# Patient Record
Sex: MALE | Race: WHITE | NOT HISPANIC OR LATINO | URBAN - METROPOLITAN AREA
[De-identification: names, ages, dates, MRNs, and addresses within clinical notes are randomized per-mention and may not be internally consistent; named-entity substitution may affect disease eponyms.]

---

## 2017-06-06 ENCOUNTER — EMERGENCY (EMERGENCY)
Facility: HOSPITAL | Age: 67
LOS: 1 days | Discharge: HOME | End: 2017-06-06
Attending: EMERGENCY MEDICINE | Admitting: EMERGENCY MEDICINE
Payer: MEDICARE

## 2017-06-06 VITALS
WEIGHT: 231.93 LBS | DIASTOLIC BLOOD PRESSURE: 100 MMHG | OXYGEN SATURATION: 96 % | SYSTOLIC BLOOD PRESSURE: 195 MMHG | TEMPERATURE: 98 F | HEART RATE: 104 BPM | HEIGHT: 73 IN | RESPIRATION RATE: 16 BRPM

## 2017-06-06 VITALS
OXYGEN SATURATION: 99 % | HEART RATE: 98 BPM | SYSTOLIC BLOOD PRESSURE: 166 MMHG | DIASTOLIC BLOOD PRESSURE: 88 MMHG | RESPIRATION RATE: 16 BRPM | TEMPERATURE: 98 F

## 2017-06-06 DIAGNOSIS — R06.02 SHORTNESS OF BREATH: ICD-10-CM

## 2017-06-06 DIAGNOSIS — Z87.891 PERSONAL HISTORY OF NICOTINE DEPENDENCE: ICD-10-CM

## 2017-06-06 DIAGNOSIS — R07.89 OTHER CHEST PAIN: ICD-10-CM

## 2017-06-06 DIAGNOSIS — R00.2 PALPITATIONS: ICD-10-CM

## 2017-06-06 DIAGNOSIS — I10 ESSENTIAL (PRIMARY) HYPERTENSION: ICD-10-CM

## 2017-06-06 LAB
ALBUMIN SERPL ELPH-MCNC: 3.9 G/DL — SIGNIFICANT CHANGE UP (ref 3.3–5)
ALP SERPL-CCNC: 124 U/L — HIGH (ref 40–120)
ALT FLD-CCNC: 43 U/L — SIGNIFICANT CHANGE UP (ref 12–78)
ANION GAP SERPL CALC-SCNC: 7 MMOL/L — SIGNIFICANT CHANGE UP (ref 5–17)
AST SERPL-CCNC: 30 U/L — SIGNIFICANT CHANGE UP (ref 15–37)
BASOPHILS # BLD AUTO: 0 K/UL — SIGNIFICANT CHANGE UP (ref 0–0.2)
BASOPHILS NFR BLD AUTO: 0.7 % — SIGNIFICANT CHANGE UP (ref 0–2)
BILIRUB SERPL-MCNC: 0.8 MG/DL — SIGNIFICANT CHANGE UP (ref 0.2–1.2)
BUN SERPL-MCNC: 16 MG/DL — SIGNIFICANT CHANGE UP (ref 7–23)
CALCIUM SERPL-MCNC: 8.7 MG/DL — SIGNIFICANT CHANGE UP (ref 8.5–10.1)
CHLORIDE SERPL-SCNC: 104 MMOL/L — SIGNIFICANT CHANGE UP (ref 96–108)
CK SERPL-CCNC: 151 U/L — SIGNIFICANT CHANGE UP (ref 26–308)
CO2 SERPL-SCNC: 26 MMOL/L — SIGNIFICANT CHANGE UP (ref 22–31)
CREAT SERPL-MCNC: 1.2 MG/DL — SIGNIFICANT CHANGE UP (ref 0.5–1.3)
D DIMER BLD IA.RAPID-MCNC: <150 NG/ML DDU — SIGNIFICANT CHANGE UP
EOSINOPHIL # BLD AUTO: 0 K/UL — SIGNIFICANT CHANGE UP (ref 0–0.5)
EOSINOPHIL NFR BLD AUTO: 0.5 % — SIGNIFICANT CHANGE UP (ref 0–6)
GLUCOSE SERPL-MCNC: 225 MG/DL — HIGH (ref 70–99)
HCT VFR BLD CALC: 48.1 % — SIGNIFICANT CHANGE UP (ref 39–50)
HGB BLD-MCNC: 13.9 G/DL — SIGNIFICANT CHANGE UP (ref 13–17)
INR BLD: 0.96 RATIO — SIGNIFICANT CHANGE UP (ref 0.88–1.16)
LYMPHOCYTES # BLD AUTO: 0.8 K/UL — LOW (ref 1–3.3)
LYMPHOCYTES # BLD AUTO: 11.7 % — LOW (ref 13–44)
MCHC RBC-ENTMCNC: 25.1 PG — LOW (ref 27–34)
MCHC RBC-ENTMCNC: 28.8 GM/DL — LOW (ref 32–36)
MCV RBC AUTO: 87 FL — SIGNIFICANT CHANGE UP (ref 80–100)
MONOCYTES # BLD AUTO: 0.1 K/UL — SIGNIFICANT CHANGE UP (ref 0–0.9)
MONOCYTES NFR BLD AUTO: 1.5 % — SIGNIFICANT CHANGE UP (ref 1–9)
NEUTROPHILS # BLD AUTO: 5.9 K/UL — SIGNIFICANT CHANGE UP (ref 1.8–7.4)
NEUTROPHILS NFR BLD AUTO: 85.6 % — HIGH (ref 43–77)
NT-PROBNP SERPL-SCNC: 21 PG/ML — SIGNIFICANT CHANGE UP (ref 0–125)
PLATELET # BLD AUTO: 239 K/UL — SIGNIFICANT CHANGE UP (ref 150–400)
POTASSIUM SERPL-MCNC: 3.4 MMOL/L — LOW (ref 3.5–5.3)
POTASSIUM SERPL-SCNC: 3.4 MMOL/L — LOW (ref 3.5–5.3)
PROT SERPL-MCNC: 7.8 G/DL — SIGNIFICANT CHANGE UP (ref 6–8.3)
PROTHROM AB SERPL-ACNC: 10.4 SEC — SIGNIFICANT CHANGE UP (ref 9.8–12.7)
RBC # BLD: 5.52 M/UL — SIGNIFICANT CHANGE UP (ref 4.2–5.8)
RBC # FLD: 12.1 % — SIGNIFICANT CHANGE UP (ref 10.3–14.5)
SODIUM SERPL-SCNC: 137 MMOL/L — SIGNIFICANT CHANGE UP (ref 135–145)
TROPONIN I SERPL-MCNC: <.015 NG/ML — SIGNIFICANT CHANGE UP (ref 0.01–0.04)
WBC # BLD: 6.9 K/UL — SIGNIFICANT CHANGE UP (ref 3.8–10.5)
WBC # FLD AUTO: 6.9 K/UL — SIGNIFICANT CHANGE UP (ref 3.8–10.5)

## 2017-06-06 PROCEDURE — 99284 EMERGENCY DEPT VISIT MOD MDM: CPT

## 2017-06-06 PROCEDURE — 71045 X-RAY EXAM CHEST 1 VIEW: CPT

## 2017-06-06 PROCEDURE — 93005 ELECTROCARDIOGRAM TRACING: CPT

## 2017-06-06 PROCEDURE — 84484 ASSAY OF TROPONIN QUANT: CPT

## 2017-06-06 PROCEDURE — 94640 AIRWAY INHALATION TREATMENT: CPT

## 2017-06-06 PROCEDURE — 85379 FIBRIN DEGRADATION QUANT: CPT

## 2017-06-06 PROCEDURE — 85610 PROTHROMBIN TIME: CPT

## 2017-06-06 PROCEDURE — 85027 COMPLETE CBC AUTOMATED: CPT

## 2017-06-06 PROCEDURE — 71010: CPT | Mod: 26

## 2017-06-06 PROCEDURE — 83880 ASSAY OF NATRIURETIC PEPTIDE: CPT

## 2017-06-06 PROCEDURE — 80053 COMPREHEN METABOLIC PANEL: CPT

## 2017-06-06 PROCEDURE — 36000 PLACE NEEDLE IN VEIN: CPT

## 2017-06-06 PROCEDURE — 82550 ASSAY OF CK (CPK): CPT

## 2017-06-06 PROCEDURE — 99285 EMERGENCY DEPT VISIT HI MDM: CPT

## 2017-06-06 RX ORDER — IPRATROPIUM/ALBUTEROL SULFATE 18-103MCG
3 AEROSOL WITH ADAPTER (GRAM) INHALATION ONCE
Qty: 0 | Refills: 0 | Status: COMPLETED | OUTPATIENT
Start: 2017-06-06 | End: 2017-06-06

## 2017-06-06 RX ORDER — ASPIRIN/CALCIUM CARB/MAGNESIUM 324 MG
325 TABLET ORAL ONCE
Qty: 0 | Refills: 0 | Status: COMPLETED | OUTPATIENT
Start: 2017-06-06 | End: 2017-06-06

## 2017-06-06 RX ORDER — SODIUM CHLORIDE 9 MG/ML
1000 INJECTION INTRAMUSCULAR; INTRAVENOUS; SUBCUTANEOUS ONCE
Qty: 0 | Refills: 0 | Status: COMPLETED | OUTPATIENT
Start: 2017-06-06 | End: 2017-06-06

## 2017-06-06 RX ADMIN — Medication 3 MILLILITER(S): at 17:01

## 2017-06-06 RX ADMIN — SODIUM CHLORIDE 1000 MILLILITER(S): 9 INJECTION INTRAMUSCULAR; INTRAVENOUS; SUBCUTANEOUS at 17:02

## 2017-06-06 RX ADMIN — Medication 325 MILLIGRAM(S): at 17:01

## 2017-06-06 NOTE — ED PROVIDER NOTE - OBJECTIVE STATEMENT
67 male presents to ER c/o chest discomfort, cough shortness of breath with exertion, palpitations over the past week, getting worse in the last 3 days, went to urgent care last night, placed on medrol dosepack, started this morning, used rescue inhaler x 2 and advair.

## 2017-06-06 NOTE — ED PROVIDER NOTE - PROGRESS NOTE DETAILS
Patient feeling well, blood pressure improved, labs, ekg, chest xray reviwed, no acute findings, spoke with PMD Dr. Sharma, case discussed, will see patient in office tomorrow

## 2017-06-06 NOTE — ED ADULT NURSE NOTE - CHPI ED SYMPTOMS NEG
no back pain/no cough/no vomiting/no nausea/no dizziness/no fever/no diaphoresis/no chills/no syncope

## 2017-06-06 NOTE — ED ADULT NURSE NOTE - OBJECTIVE STATEMENT
pt reporting non radiating Chest tightness with SOB x3-4 days. pt has hx of asthma, and yesterday was placed on a medrol dose without relief. pt reporting pain in deep inspiration and on exertion. denies fever, chills, coughing, wheezing. denies palpations. no sob noted on arrival. speaks in full sentences, equal chest and rise and fall. pt reporting non radiating Chest tightness with SOB x3-4 days. pt has hx of asthma, and yesterday was placed on a medrol dose without relief. pt reporting pain with deep inspiration and on exertion with dry wheezy cough. denies fever, chills. denies palpations. no sob noted on arrival. speaks in full sentences, equal chest and rise and fall.

## 2017-06-06 NOTE — ED PROVIDER NOTE - MEDICAL DECISION MAKING DETAILS
67 male with chest discomfort, cough, shortness of breath, f/u labs, ekg, noted elevated blood pressure, to re-eval

## 2017-06-14 ENCOUNTER — INPATIENT (INPATIENT)
Facility: HOSPITAL | Age: 67
LOS: 1 days | Discharge: HOME | DRG: 247 | End: 2017-06-16
Attending: INTERNAL MEDICINE | Admitting: INTERNAL MEDICINE
Payer: MEDICARE

## 2017-06-14 VITALS
RESPIRATION RATE: 18 BRPM | TEMPERATURE: 98 F | DIASTOLIC BLOOD PRESSURE: 95 MMHG | WEIGHT: 233.91 LBS | OXYGEN SATURATION: 98 % | HEIGHT: 73 IN | HEART RATE: 72 BPM | SYSTOLIC BLOOD PRESSURE: 137 MMHG

## 2017-06-14 DIAGNOSIS — R94.39 ABNORMAL RESULT OF OTHER CARDIOVASCULAR FUNCTION STUDY: ICD-10-CM

## 2017-06-14 LAB
ALBUMIN SERPL ELPH-MCNC: 4.6 G/DL — SIGNIFICANT CHANGE UP (ref 3.3–5)
ALP SERPL-CCNC: 101 U/L — SIGNIFICANT CHANGE UP (ref 40–120)
ALT FLD-CCNC: 32 U/L RC — SIGNIFICANT CHANGE UP (ref 10–45)
ANION GAP SERPL CALC-SCNC: 13 MMOL/L — SIGNIFICANT CHANGE UP (ref 5–17)
AST SERPL-CCNC: 24 U/L — SIGNIFICANT CHANGE UP (ref 10–40)
BILIRUB SERPL-MCNC: 0.9 MG/DL — SIGNIFICANT CHANGE UP (ref 0.2–1.2)
BUN SERPL-MCNC: 19 MG/DL — SIGNIFICANT CHANGE UP (ref 7–23)
CALCIUM SERPL-MCNC: 9.6 MG/DL — SIGNIFICANT CHANGE UP (ref 8.4–10.5)
CHLORIDE SERPL-SCNC: 103 MMOL/L — SIGNIFICANT CHANGE UP (ref 96–108)
CO2 SERPL-SCNC: 25 MMOL/L — SIGNIFICANT CHANGE UP (ref 22–31)
CREAT SERPL-MCNC: 1.19 MG/DL — SIGNIFICANT CHANGE UP (ref 0.5–1.3)
GLUCOSE SERPL-MCNC: 102 MG/DL — HIGH (ref 70–99)
HCT VFR BLD CALC: 44.4 % — SIGNIFICANT CHANGE UP (ref 39–50)
HGB BLD-MCNC: 15.6 G/DL — SIGNIFICANT CHANGE UP (ref 13–17)
MCHC RBC-ENTMCNC: 30.9 PG — SIGNIFICANT CHANGE UP (ref 27–34)
MCHC RBC-ENTMCNC: 35.2 GM/DL — SIGNIFICANT CHANGE UP (ref 32–36)
MCV RBC AUTO: 87.8 FL — SIGNIFICANT CHANGE UP (ref 80–100)
PLATELET # BLD AUTO: 241 K/UL — SIGNIFICANT CHANGE UP (ref 150–400)
POTASSIUM SERPL-MCNC: 3.7 MMOL/L — SIGNIFICANT CHANGE UP (ref 3.5–5.3)
POTASSIUM SERPL-SCNC: 3.7 MMOL/L — SIGNIFICANT CHANGE UP (ref 3.5–5.3)
PROT SERPL-MCNC: 7.9 G/DL — SIGNIFICANT CHANGE UP (ref 6–8.3)
RBC # BLD: 5.05 M/UL — SIGNIFICANT CHANGE UP (ref 4.2–5.8)
RBC # FLD: 12.2 % — SIGNIFICANT CHANGE UP (ref 10.3–14.5)
SODIUM SERPL-SCNC: 141 MMOL/L — SIGNIFICANT CHANGE UP (ref 135–145)
WBC # BLD: 11.1 K/UL — HIGH (ref 3.8–10.5)
WBC # FLD AUTO: 11.1 K/UL — HIGH (ref 3.8–10.5)

## 2017-06-14 PROCEDURE — 92928 PRQ TCAT PLMT NTRAC ST 1 LES: CPT | Mod: LC,GC

## 2017-06-14 PROCEDURE — 93010 ELECTROCARDIOGRAM REPORT: CPT | Mod: 76

## 2017-06-14 PROCEDURE — 93458 L HRT ARTERY/VENTRICLE ANGIO: CPT | Mod: 26,59,GC

## 2017-06-14 RX ORDER — CLOPIDOGREL BISULFATE 75 MG/1
75 TABLET, FILM COATED ORAL DAILY
Qty: 0 | Refills: 0 | Status: DISCONTINUED | OUTPATIENT
Start: 2017-06-15 | End: 2017-06-16

## 2017-06-14 RX ORDER — RANOLAZINE 500 MG/1
500 TABLET, FILM COATED, EXTENDED RELEASE ORAL ONCE
Qty: 0 | Refills: 0 | Status: DISCONTINUED | OUTPATIENT
Start: 2017-06-14 | End: 2017-06-16

## 2017-06-14 RX ORDER — ATORVASTATIN CALCIUM 80 MG/1
20 TABLET, FILM COATED ORAL AT BEDTIME
Qty: 0 | Refills: 0 | Status: DISCONTINUED | OUTPATIENT
Start: 2017-06-14 | End: 2017-06-16

## 2017-06-14 RX ORDER — MONTELUKAST 4 MG/1
10 TABLET, CHEWABLE ORAL DAILY
Qty: 0 | Refills: 0 | Status: DISCONTINUED | OUTPATIENT
Start: 2017-06-14 | End: 2017-06-16

## 2017-06-14 RX ORDER — AMLODIPINE BESYLATE 2.5 MG/1
5 TABLET ORAL DAILY
Qty: 0 | Refills: 0 | Status: DISCONTINUED | OUTPATIENT
Start: 2017-06-14 | End: 2017-06-16

## 2017-06-14 RX ORDER — LOSARTAN POTASSIUM 100 MG/1
100 TABLET, FILM COATED ORAL DAILY
Qty: 0 | Refills: 0 | Status: DISCONTINUED | OUTPATIENT
Start: 2017-06-14 | End: 2017-06-16

## 2017-06-14 RX ORDER — ASPIRIN/CALCIUM CARB/MAGNESIUM 324 MG
81 TABLET ORAL DAILY
Qty: 0 | Refills: 0 | Status: DISCONTINUED | OUTPATIENT
Start: 2017-06-14 | End: 2017-06-16

## 2017-06-14 RX ORDER — POTASSIUM CHLORIDE 20 MEQ
1 PACKET (EA) ORAL
Qty: 0 | Refills: 0 | COMMUNITY

## 2017-06-14 RX ORDER — POTASSIUM CHLORIDE 20 MEQ
40 PACKET (EA) ORAL DAILY
Qty: 0 | Refills: 0 | Status: DISCONTINUED | OUTPATIENT
Start: 2017-06-14 | End: 2017-06-16

## 2017-06-14 RX ORDER — ALBUTEROL 90 UG/1
2 AEROSOL, METERED ORAL EVERY 6 HOURS
Qty: 0 | Refills: 0 | Status: DISCONTINUED | OUTPATIENT
Start: 2017-06-14 | End: 2017-06-16

## 2017-06-14 RX ORDER — ROSUVASTATIN CALCIUM 5 MG/1
0 TABLET ORAL
Qty: 0 | Refills: 0 | COMMUNITY

## 2017-06-14 RX ORDER — BUDESONIDE AND FORMOTEROL FUMARATE DIHYDRATE 160; 4.5 UG/1; UG/1
2 AEROSOL RESPIRATORY (INHALATION)
Qty: 0 | Refills: 0 | Status: DISCONTINUED | OUTPATIENT
Start: 2017-06-14 | End: 2017-06-16

## 2017-06-14 NOTE — H&P CARDIOLOGY - HISTORY OF PRESENT ILLNESS
This is a 66 yo male PMH asthma well controlled, HTN, HLD, former smoker 22 pack years, obesity BMI 30.8 presented to cardiologist, Dr. Hamilton, with complaints of progressing episodes of exertional chest pressure radiating to jaw while walking relieved after few minutes of rest over the past month. Pt. also states he has had peripheral edema on occasion over past year.  Pt. denies SOB/RAHMAN, dizziness, diaphoresis, palpitations, nausea, vomiting, or syncope. Pt. states he went to an urgent care after one episode where he was given a Medrol dose pack.  Stress Echo 6/14/17 fair exercise tolerance, 1mm horizontal ST depression V3-V6, normal LV systolic function.  Images demonstrate entire apex, inferior and posterior walls became akinetic, all remaining wall segments became hyperdynamic.  Ischemia in the RCA/LAD distribution.  Pt. presents now asymptomatic for further evaluation and cardiac cath. This is a 68 yo male PMH asthma well controlled, HTN, HLD, former smoker 22 pack years, obesity BMI 30.8 presented to cardiologist, Dr. Hamilton, with complaints of progressing episodes of exertional chest pressure radiating to jaw while walking relieved after few minutes of rest over the past month. Pt. also states he has had peripheral edema on occasion over past year.  Pt. denies SOB/RAHMAN, dizziness, diaphoresis, palpitations, nausea, vomiting, or syncope. Pt. states he went to an urgent care after one episode where he was given a Medrol dose pack.  Stress Echo 6/14/17 fair exercise tolerance, 1mm horizontal ST depression V3-V6, normal LV systolic function.  Images demonstrate entire apex, inferior and posterior walls became akinetic, all remaining wall segments became hyperdynamic.  Ischemia in the RCA/LAD distribution.  Pt. presents now asymptomatic for further evaluation and cardiac cath.     Antianginal regimen:  Amlodipine, given Ranexa 500 mg po x 1

## 2017-06-14 NOTE — PATIENT PROFILE ADULT. - PATIENT REPRESENTATIVE: ( YOU CAN CHOOSE ANY PERSON THAT CAN ASSIST YOU WITH YOUR HEALTH CARE PREFERENCES, DOES NOT HAVE TO BE A SPOUSE, IMMEDIATE FAMILY OR SIGNIFICANT OTHER/PARTNER)
To start maura forte for gall bladder protection. May advance diet to solid phase. Remember to measure portions, to keep the focus on increasing your protein & keeping your carbs low. Continue the use of protein shakes. To follow recommendations of dietician. Stay hydrated! Eat regularly, eat slowly & do not drink with your meals. Vitamins to be taken include your multivitamin, B12, calcium citrate, Vit D & Bcomplex. Refer to your notebook & handouts for dosages. Continue to increase cardio activity. May add resistance exercises in 2 weeks. Continue follow-up with your PCP. Return to this office in 1 month. Call if questions/concerns prior to your office visit. Walking for Exercise: Care Instructions  Your Care Instructions  Walking is one of the easiest ways to get the exercise you need for good health. A brisk, 30-minute walk each day can help you feel better and have more energy. It can help you lower your risk of disease. Walking can help you keep your bones strong and your heart healthy. Check with your doctor before you start a walking plan if you have heart problems, other health issues, or you have not been active in a long time. Follow your doctor's instructions for safe levels of exercise. Follow-up care is a key part of your treatment and safety. Be sure to make and go to all appointments, and call your doctor if you are having problems. It's also a good idea to know your test results and keep a list of the medicines you take. How can you care for yourself at home? Getting started  · Start slowly and set a short-term goal. For example, walk for 5 or 10 minutes every day. · Bit by bit, increase the amount you walk every day. Try for at least 30 minutes on most days of the week. You also may want to swim, bike, or do other activities. · If finding enough time is a problem, it is fine to be active in blocks of 10 minutes or more throughout your day and week.   · To get the heart-healthy benefits of walking, you need to walk briskly enough to increase your heart rate and breathing, but not so fast that you cannot talk comfortably. · Wear comfortable shoes that fit well and provide good support for your feet and ankles. Staying with your plan  · After you've made walking a habit, set a longer-term goal. You may want to set a goal of walking briskly for longer or walking farther. Experts say to do 2½ hours of moderate activity a week. A faster heartbeat is what defines moderate-level activity. · To stay motivated, walk with friends, coworkers, or pets. · Use a phone sandra or pedometer to track your steps each day. Set a goal to increase your steps. Once you get there, set a higher goal. Adults should work toward 10,000 steps a day. Children who are 10to 15years old need more steps--at least 12,000 for girls and at least 15,000 for boys. · If the weather keeps you from walking outside, go for walks at the mall with a friend. Local schools and churches may have indoor gyms where you can walk. Fitting a walk into your workday  · Park several blocks away from work, or get off the bus a few stops early. · Use the stairs instead of the elevator, at least for a few floors. · Suggest holding meetings with colleagues during a walk inside or outside the building. · Use the restroom that is the farthest from your desk or workstation. · Use your morning and afternoon breaks to take quick 15-minute walks. Staying safe  · Know your surroundings. Walk in a well-lighted, safe place. If it is dark, walk with a partner. Wear light-colored clothing. If you can, buy a vest or jacket that reflects light. · Carry a cell phone for emergencies. · Drink plenty of water. Take a water bottle with you when you walk. This is very important if it is hot out. · Be careful not to slip on wet or icy ground. You can buy \"grippers\" for your shoes to help keep you from slipping.   · Pay attention to your walking surface. Use sidewalks and paths. · If you have breathing problems like asthma or COPD, ask your doctor when it is safe for you to walk outdoors. Cold, dry air, smog, pollen, or other things in the air could cause breathing problems. Where can you learn more? Go to http://bentley-gina.info/. Enter R159 in the search box to learn more about \"Walking for Exercise: Care Instructions. \"  Current as of: May 27, 2016  Content Version: 11.2  © 6510-2073 [employer]. Care instructions adapted under license by [employer] (which disclaims liability or warranty for this information). If you have questions about a medical condition or this instruction, always ask your healthcare professional. [address] any warranty or liability for your use of this information. Declines

## 2017-06-14 NOTE — H&P CARDIOLOGY - PMH
Asthma    Former smoker    HLD (hyperlipidemia)    Hypertension    Lower extremity edema    Lumbar herniated disc  l4

## 2017-06-14 NOTE — PATIENT PROFILE ADULT. - VISION (WITH CORRECTIVE LENSES IF THE PATIENT USUALLY WEARS THEM):
Normal vision: sees adequately in most situations; can see medication labels, newsprint/with reading glasses

## 2017-06-15 DIAGNOSIS — E78.5 HYPERLIPIDEMIA, UNSPECIFIED: ICD-10-CM

## 2017-06-15 DIAGNOSIS — I25.118 ATHEROSCLEROTIC HEART DISEASE OF NATIVE CORONARY ARTERY WITH OTHER FORMS OF ANGINA PECTORIS: ICD-10-CM

## 2017-06-15 DIAGNOSIS — I10 ESSENTIAL (PRIMARY) HYPERTENSION: ICD-10-CM

## 2017-06-15 LAB
ANION GAP SERPL CALC-SCNC: 13 MMOL/L — SIGNIFICANT CHANGE UP (ref 5–17)
BASOPHILS # BLD AUTO: 0 K/UL — SIGNIFICANT CHANGE UP (ref 0–0.2)
BASOPHILS NFR BLD AUTO: 0.6 % — SIGNIFICANT CHANGE UP (ref 0–2)
BUN SERPL-MCNC: 21 MG/DL — SIGNIFICANT CHANGE UP (ref 7–23)
CALCIUM SERPL-MCNC: 8.9 MG/DL — SIGNIFICANT CHANGE UP (ref 8.4–10.5)
CHLORIDE SERPL-SCNC: 102 MMOL/L — SIGNIFICANT CHANGE UP (ref 96–108)
CO2 SERPL-SCNC: 25 MMOL/L — SIGNIFICANT CHANGE UP (ref 22–31)
CREAT SERPL-MCNC: 1.18 MG/DL — SIGNIFICANT CHANGE UP (ref 0.5–1.3)
EOSINOPHIL # BLD AUTO: 0.3 K/UL — SIGNIFICANT CHANGE UP (ref 0–0.5)
EOSINOPHIL NFR BLD AUTO: 4.6 % — SIGNIFICANT CHANGE UP (ref 0–6)
GLUCOSE SERPL-MCNC: 110 MG/DL — HIGH (ref 70–99)
HBA1C BLD-MCNC: 5.5 % — SIGNIFICANT CHANGE UP (ref 4–5.6)
HCT VFR BLD CALC: 40.6 % — SIGNIFICANT CHANGE UP (ref 39–50)
HGB BLD-MCNC: 14.4 G/DL — SIGNIFICANT CHANGE UP (ref 13–17)
LYMPHOCYTES # BLD AUTO: 2 K/UL — SIGNIFICANT CHANGE UP (ref 1–3.3)
LYMPHOCYTES # BLD AUTO: 25.8 % — SIGNIFICANT CHANGE UP (ref 13–44)
MCHC RBC-ENTMCNC: 31.1 PG — SIGNIFICANT CHANGE UP (ref 27–34)
MCHC RBC-ENTMCNC: 35.4 GM/DL — SIGNIFICANT CHANGE UP (ref 32–36)
MCV RBC AUTO: 87.8 FL — SIGNIFICANT CHANGE UP (ref 80–100)
MONOCYTES # BLD AUTO: 0.8 K/UL — SIGNIFICANT CHANGE UP (ref 0–0.9)
MONOCYTES NFR BLD AUTO: 10.2 % — SIGNIFICANT CHANGE UP (ref 2–14)
NEUTROPHILS # BLD AUTO: 4.5 K/UL — SIGNIFICANT CHANGE UP (ref 1.8–7.4)
NEUTROPHILS NFR BLD AUTO: 58.8 % — SIGNIFICANT CHANGE UP (ref 43–77)
PLATELET # BLD AUTO: 187 K/UL — SIGNIFICANT CHANGE UP (ref 150–400)
POTASSIUM SERPL-MCNC: 3.5 MMOL/L — SIGNIFICANT CHANGE UP (ref 3.5–5.3)
POTASSIUM SERPL-SCNC: 3.5 MMOL/L — SIGNIFICANT CHANGE UP (ref 3.5–5.3)
RBC # BLD: 4.62 M/UL — SIGNIFICANT CHANGE UP (ref 4.2–5.8)
RBC # FLD: 12.2 % — SIGNIFICANT CHANGE UP (ref 10.3–14.5)
SODIUM SERPL-SCNC: 140 MMOL/L — SIGNIFICANT CHANGE UP (ref 135–145)
WBC # BLD: 7.7 K/UL — SIGNIFICANT CHANGE UP (ref 3.8–10.5)
WBC # FLD AUTO: 7.7 K/UL — SIGNIFICANT CHANGE UP (ref 3.8–10.5)

## 2017-06-15 PROCEDURE — 93010 ELECTROCARDIOGRAM REPORT: CPT | Mod: 77

## 2017-06-15 PROCEDURE — 92928 PRQ TCAT PLMT NTRAC ST 1 LES: CPT | Mod: LD,GC

## 2017-06-15 PROCEDURE — 93010 ELECTROCARDIOGRAM REPORT: CPT

## 2017-06-15 RX ORDER — ACETAMINOPHEN 500 MG
650 TABLET ORAL EVERY 6 HOURS
Qty: 0 | Refills: 0 | Status: DISCONTINUED | OUTPATIENT
Start: 2017-06-15 | End: 2017-06-16

## 2017-06-15 RX ORDER — POTASSIUM BICARBONATE 978 MG/1
25 TABLET, EFFERVESCENT ORAL ONCE
Qty: 0 | Refills: 0 | Status: DISCONTINUED | OUTPATIENT
Start: 2017-06-15 | End: 2017-06-15

## 2017-06-15 RX ADMIN — AMLODIPINE BESYLATE 5 MILLIGRAM(S): 2.5 TABLET ORAL at 05:51

## 2017-06-15 RX ADMIN — Medication 650 MILLIGRAM(S): at 18:21

## 2017-06-15 RX ADMIN — BUDESONIDE AND FORMOTEROL FUMARATE DIHYDRATE 2 PUFF(S): 160; 4.5 AEROSOL RESPIRATORY (INHALATION) at 17:42

## 2017-06-15 RX ADMIN — BUDESONIDE AND FORMOTEROL FUMARATE DIHYDRATE 2 PUFF(S): 160; 4.5 AEROSOL RESPIRATORY (INHALATION) at 05:57

## 2017-06-15 RX ADMIN — ATORVASTATIN CALCIUM 20 MILLIGRAM(S): 80 TABLET, FILM COATED ORAL at 21:58

## 2017-06-15 RX ADMIN — Medication 81 MILLIGRAM(S): at 05:52

## 2017-06-15 RX ADMIN — Medication 40 MILLIEQUIVALENT(S): at 05:52

## 2017-06-15 RX ADMIN — MONTELUKAST 10 MILLIGRAM(S): 4 TABLET, CHEWABLE ORAL at 05:52

## 2017-06-15 RX ADMIN — Medication 650 MILLIGRAM(S): at 18:50

## 2017-06-15 RX ADMIN — CLOPIDOGREL BISULFATE 75 MILLIGRAM(S): 75 TABLET, FILM COATED ORAL at 05:52

## 2017-06-15 RX ADMIN — LOSARTAN POTASSIUM 100 MILLIGRAM(S): 100 TABLET, FILM COATED ORAL at 05:51

## 2017-06-15 NOTE — PROGRESS NOTE ADULT - ASSESSMENT
HPI:  This is a 66 yo male PMH asthma well controlled, HTN, HLD, former smoker 22 pack years, obesity BMI 30.8 presented to cardiologist, Dr. Hamilton, with complaints of progressing episodes of exertional chest pressure radiating to jaw while walking relieved after few minutes of rest over the past month. Pt. also states he has had peripheral edema on occasion over past year.  Pt. denies SOB/RAHMAN, dizziness, diaphoresis, palpitations, nausea, vomiting, or syncope. Pt. states he went to an urgent care after one episode where he was given a Medrol dose pack.  Stress Echo 6/14/17 fair exercise tolerance, 1mm horizontal ST depression V3-V6, normal LV systolic function.  Images demonstrate entire apex, inferior and posterior walls became akinetic, all remaining wall segments became hyperdynamic.  Ischemia in the RCA/LAD distribution.  Pt. presents now asymptomatic for further evaluation and cardiac cath.     Antianginal regimen:  Amlodipine, given Ranexa 500 mg po x 1 (14 Jun 2017 16:27)

## 2017-06-15 NOTE — PROGRESS NOTE ADULT - SUBJECTIVE AND OBJECTIVE BOX
Patient is a 67y old  Male who presents with a chief complaint of         Allergies    No Known Allergies    Intolerances        Medications:  ranolazine 500milliGRAM(s) Oral once  aspirin enteric coated 81milliGRAM(s) Oral daily  atorvastatin 20milliGRAM(s) Oral at bedtime  ALBUTerol    90 MICROgram(s) HFA Inhaler 2Puff(s) Inhalation every 6 hours PRN  amLODIPine   Tablet 5milliGRAM(s) Oral daily  montelukast 10milliGRAM(s) Oral daily  potassium chloride    Tablet ER 40milliEquivalent(s) Oral daily  losartan 100milliGRAM(s) Oral daily  hydrochlorothiazide   Tablet 25milliGRAM(s) Oral daily  clopidogrel Tablet 75milliGRAM(s) Oral daily  buDESOnide 160 MICROgram(s)/formoterol 4.5 MICROgram(s) Inhaler 2Puff(s) Inhalation two times a day      Vitals:  T(C): 36.7, Max: 36.8 ( @ 16:27)  HR: 61 (60 - 78)  BP: 117/68 (117/68 - 148/92)  BP(mean): 109 (109 - 109)  RR: 17 (17 - 18)  SpO2: 99% (94% - 99%)  Wt(kg): --  Daily Height in cm: 185.42 (2017 16:27)    Daily Weight in k.1 (2017 16:27)  I&O's Summary        Physical Exam:  Appearance: Normal  Eyes: PERRL, EOMI  HENT: Normal oral muscosa, NC/AT  Cardiovascular: S1S2, RRR, No M/R/G, no JVD, No Lower extremity edema  Procedural Access Site: No hematoma, Non-tender to palpation, 2+ pulse, No bruit, No Ecchymosis  Respiratory: Clear to auscultation bilaterally  Gastrointestinal: Soft, Non tender, Normal Bowel Sounds  Musculoskeletal: No clubbing, No joint deformity   Neurologic: Non-focal  Lymphatic: No lymphadenopathy  Psychiatry: AAOx3, Mood & affect appropriate  Skin: No rashes, No ecchymoses, No cyanosis        141  |  103  |  19  ----------------------------<  102<H>  3.7   |  25  |  1.19    Ca    9.6      2017 16:32    TPro  7.9  /  Alb  4.6  /  TBili  0.9  /  DBili  x   /  AST  24  /  ALT  32  /  AlkPhos  101  06-14            Lipid panel   Hgb A1c                         14.4   7.7   )-----------( 187      ( 15 Ayaan 2017 05:21 )             40.6         ECG:    Echo:    Stress Testing:     Cath: one stent to the distal left circ , staged PCI to prox LAD on 6/15    Imaging:    Interpretation of Telemetry: Patient is a 67y old  Male who presents with a chief complaint of         Allergies    No Known Allergies    Intolerances        Medications:  ranolazine 500milliGRAM(s) Oral once  aspirin enteric coated 81milliGRAM(s) Oral daily  atorvastatin 20milliGRAM(s) Oral at bedtime  ALBUTerol    90 MICROgram(s) HFA Inhaler 2Puff(s) Inhalation every 6 hours PRN  amLODIPine   Tablet 5milliGRAM(s) Oral daily  montelukast 10milliGRAM(s) Oral daily  potassium chloride    Tablet ER 40milliEquivalent(s) Oral daily  losartan 100milliGRAM(s) Oral daily  hydrochlorothiazide   Tablet 25milliGRAM(s) Oral daily  clopidogrel Tablet 75milliGRAM(s) Oral daily  buDESOnide 160 MICROgram(s)/formoterol 4.5 MICROgram(s) Inhaler 2Puff(s) Inhalation two times a day      Vitals:  T(C): 36.7, Max: 36.8 (-14 @ 16:27)  HR: 61 (60 - 78)  BP: 117/68 (117/68 - 148/92)  BP(mean): 109 (109 - 109)  RR: 17 (17 - 18)  SpO2: 99% (94% - 99%)  Wt(kg): --  Daily Height in cm: 185.42 (2017 16:27)    Daily Weight in k.1 (2017 16:27)  I&O's Summary        Physical Exam:  Appearance: Normal  Eyes: PERRL, EOMI  HENT: Normal oral muscosa, NC/AT  Cardiovascular: S1S2, RRR, No M/R/G, no JVD, No Lower extremity edema  Procedural Access Site: No hematoma, Non-tender to palpation, 2+ pulse, No bruit, No Ecchymosis  Respiratory: Clear to auscultation bilaterally  Gastrointestinal: Soft, Non tender, Normal Bowel Sounds  Musculoskeletal: No clubbing, No joint deformity   Neurologic: Non-focal  Lymphatic: No lymphadenopathy  Psychiatry: AAOx3, Mood & affect appropriate  Skin: No rashes, No ecchymoses, No cyanosis    -15    140  |  102  |  21  ----------------------------<  110<H>  3.5   |  25  |  1.18    Ca    8.9      15 Ayaan 2017 05:21    TPro  7.9  /  Alb  4.6  /  TBili  0.9  /  DBili  x   /  AST  24  /  ALT  32  /  AlkPhos  101  06-14    Klyte 25 meq PO x1 for K+ 3.5            Lipid panel   Hgb A1c                         14.4   7.7   )-----------( 187      ( 15 Ayaan 2017 05:21 )             40.6         ECG: SB 49 bpm    Echo:    Stress Testing:     Cath: one stent to the distal left circ , staged PCI to the prox LAD 6/15    Imaging:    Interpretation of Telemetry: Patient is a 67y old  Male who presents with a chief complaint of chest pressure        Allergies: No Known Allergies    Intolerances        Medications:  ranolazine 500milliGRAM(s) Oral once  aspirin enteric coated 81milliGRAM(s) Oral daily  atorvastatin 20milliGRAM(s) Oral at bedtime  ALBUTerol    90 MICROgram(s) HFA Inhaler 2Puff(s) Inhalation every 6 hours PRN  amLODIPine   Tablet 5milliGRAM(s) Oral daily  montelukast 10milliGRAM(s) Oral daily  potassium chloride    Tablet ER 40milliEquivalent(s) Oral daily  losartan 100milliGRAM(s) Oral daily  hydrochlorothiazide   Tablet 25milliGRAM(s) Oral daily  clopidogrel Tablet 75milliGRAM(s) Oral daily  buDESOnide 160 MICROgram(s)/formoterol 4.5 MICROgram(s) Inhaler 2Puff(s) Inhalation two times a day      Vitals:  T(C): 36.7, Max: 36.8 ( @ 16:27)  HR: 61 (60 - 78)  BP: 117/68 (117/68 - 148/92)  BP(mean): 109 (109 - 109)  RR: 17 (17 - 18)  SpO2: 99% (94% - 99%)  Wt(kg): --  Daily Height in cm: 185.42 (2017 16:27)    Daily Weight in k.1 (2017 16:27)  I&O's Summary        Physical Exam:  Appearance: Normal  Eyes: PERRL, EOMI  HENT: Normal oral muscosa, NC/AT  Cardiovascular: S1S2, RRR, No M/R/G, no JVD, No Lower extremity edema  Procedural Access Site: No hematoma, Non-tender to palpation, 2+ pulse, No bruit, No Ecchymosis  Respiratory: Clear to auscultation bilaterally  Gastrointestinal: Soft, Non tender, Normal Bowel Sounds  Musculoskeletal: No clubbing, No joint deformity   Neurologic: Non-focal  Lymphatic: No lymphadenopathy  Psychiatry: AAOx3, Mood & affect appropriate  Skin: No rashes, No ecchymoses, No cyanosis    -15    140  |  102  |  21  ----------------------------<  110<H>  3.5   |  25  |  1.18    Ca    8.9      15 Ayaan 2017 05:21    TPro  7.9  /  Alb  4.6  /  TBili  0.9  /  DBili  x   /  AST  24  /  ALT  32  /  AlkPhos  101  06-14    Klyte 25 meq PO x1 for K+ 3.5            Lipid panel   Hgb A1c                         14.4   7.7   )-----------( 187      ( 15 Ayaan 2017 05:21 )             40.6         ECG: SB 49 bpm    Echo:    Stress Testing:     Cath: one stent to the distal left circ , staged PCI to the prox LAD 6/15    Imaging:    Interpretation of Telemetry: Patient is a 67y old  Male who presents with a chief complaint of chest pressure        Allergies: No Known Allergies    Intolerances        Medications:  ranolazine 500milliGRAM(s) Oral once  aspirin enteric coated 81milliGRAM(s) Oral daily  atorvastatin 20milliGRAM(s) Oral at bedtime  ALBUTerol    90 MICROgram(s) HFA Inhaler 2Puff(s) Inhalation every 6 hours PRN  amLODIPine   Tablet 5milliGRAM(s) Oral daily  montelukast 10milliGRAM(s) Oral daily  potassium chloride    Tablet ER 40milliEquivalent(s) Oral daily  losartan 100milliGRAM(s) Oral daily  hydrochlorothiazide   Tablet 25milliGRAM(s) Oral daily  clopidogrel Tablet 75milliGRAM(s) Oral daily  buDESOnide 160 MICROgram(s)/formoterol 4.5 MICROgram(s) Inhaler 2Puff(s) Inhalation two times a day      Vitals:  T(C): 36.7, Max: 36.8 ( @ 16:27)  HR: 61 (60 - 78)  BP: 117/68 (117/68 - 148/92)  BP(mean): 109 (109 - 109)  RR: 17 (17 - 18)  SpO2: 99% (94% - 99%)  Wt(kg): --  Daily Height in cm: 185.42 (2017 16:27)    Daily Weight in k.1 (2017 16:27)  I&O's Summary        Physical Exam:  Appearance: Normal  Eyes: PERRL, EOMI  HENT: Normal oral muscosa, NC/AT  Cardiovascular: S1S2, RRR, No M/R/G, no JVD, No Lower extremity edema  Procedural Access Site: No hematoma, Non-tender to palpation, 2+ pulse, No bruit, No Ecchymosis  Respiratory: Clear to auscultation bilaterally  Gastrointestinal: Soft, Non tender, Normal Bowel Sounds  Musculoskeletal: No clubbing, No joint deformity   Neurologic: Non-focal  Lymphatic: No lymphadenopathy  Psychiatry: AAOx3, Mood & affect appropriate  Skin: No rashes, No ecchymoses, No cyanosis    -15    140  |  102  |  21  ----------------------------<  110<H>  3.5   |  25  |  1.18    Ca    8.9      15 Ayaan 2017 05:21    TPro  7.9  /  Alb  4.6  /  TBili  0.9  /  DBili  x   /  AST  24  /  ALT  32  /  AlkPhos  101  06-14    continue KCl tablets PO for serum K+ 3.5            Lipid panel   Hgb A1c                         14.4   7.7   )-----------( 187      ( 15 Ayaan 2017 05:21 )             40.6         ECG: SB 49 bpm    Echo:    Stress Testing:     Cath: one stent to the distal left circ , staged PCI to the prox LAD 6/15    Imaging:    Interpretation of Telemetry:

## 2017-06-15 NOTE — PROGRESS NOTE ADULT - SUBJECTIVE AND OBJECTIVE BOX
67 year old male with PMH asthma, HTN, HLD, obesity who is S/P PCI ( 6/14) to dCirc. For staged PCI to p LAD today. Pt now reports allergy to shrimp/shellfish ( states he forgot to alert staff); reaction is nausea. Cath 6/14 with no adverse reaction. Dr DEION Daina made aware.    Cecilia Moncada, ANP-C.

## 2017-06-16 ENCOUNTER — TRANSCRIPTION ENCOUNTER (OUTPATIENT)
Age: 67
End: 2017-06-16

## 2017-06-16 VITALS
DIASTOLIC BLOOD PRESSURE: 88 MMHG | TEMPERATURE: 99 F | SYSTOLIC BLOOD PRESSURE: 130 MMHG | HEART RATE: 64 BPM | RESPIRATION RATE: 19 BRPM | OXYGEN SATURATION: 97 %

## 2017-06-16 LAB
ANION GAP SERPL CALC-SCNC: 12 MMOL/L — SIGNIFICANT CHANGE UP (ref 5–17)
BASOPHILS # BLD AUTO: 0 K/UL — SIGNIFICANT CHANGE UP (ref 0–0.2)
BASOPHILS NFR BLD AUTO: 0.4 % — SIGNIFICANT CHANGE UP (ref 0–2)
BUN SERPL-MCNC: 21 MG/DL — SIGNIFICANT CHANGE UP (ref 7–23)
CALCIUM SERPL-MCNC: 9 MG/DL — SIGNIFICANT CHANGE UP (ref 8.4–10.5)
CHLORIDE SERPL-SCNC: 102 MMOL/L — SIGNIFICANT CHANGE UP (ref 96–108)
CO2 SERPL-SCNC: 26 MMOL/L — SIGNIFICANT CHANGE UP (ref 22–31)
CREAT SERPL-MCNC: 1.28 MG/DL — SIGNIFICANT CHANGE UP (ref 0.5–1.3)
EOSINOPHIL # BLD AUTO: 0.3 K/UL — SIGNIFICANT CHANGE UP (ref 0–0.5)
EOSINOPHIL NFR BLD AUTO: 3.6 % — SIGNIFICANT CHANGE UP (ref 0–6)
GLUCOSE SERPL-MCNC: 98 MG/DL — SIGNIFICANT CHANGE UP (ref 70–99)
HCT VFR BLD CALC: 40.8 % — SIGNIFICANT CHANGE UP (ref 39–50)
HGB BLD-MCNC: 14.4 G/DL — SIGNIFICANT CHANGE UP (ref 13–17)
LYMPHOCYTES # BLD AUTO: 2 K/UL — SIGNIFICANT CHANGE UP (ref 1–3.3)
LYMPHOCYTES # BLD AUTO: 23.9 % — SIGNIFICANT CHANGE UP (ref 13–44)
MCHC RBC-ENTMCNC: 31.1 PG — SIGNIFICANT CHANGE UP (ref 27–34)
MCHC RBC-ENTMCNC: 35.3 GM/DL — SIGNIFICANT CHANGE UP (ref 32–36)
MCV RBC AUTO: 87.9 FL — SIGNIFICANT CHANGE UP (ref 80–100)
MONOCYTES # BLD AUTO: 0.8 K/UL — SIGNIFICANT CHANGE UP (ref 0–0.9)
MONOCYTES NFR BLD AUTO: 9.8 % — SIGNIFICANT CHANGE UP (ref 2–14)
NEUTROPHILS # BLD AUTO: 5.3 K/UL — SIGNIFICANT CHANGE UP (ref 1.8–7.4)
NEUTROPHILS NFR BLD AUTO: 62.2 % — SIGNIFICANT CHANGE UP (ref 43–77)
PLATELET # BLD AUTO: 202 K/UL — SIGNIFICANT CHANGE UP (ref 150–400)
POTASSIUM SERPL-MCNC: 3.1 MMOL/L — LOW (ref 3.5–5.3)
POTASSIUM SERPL-SCNC: 3.1 MMOL/L — LOW (ref 3.5–5.3)
RBC # BLD: 4.64 M/UL — SIGNIFICANT CHANGE UP (ref 4.2–5.8)
RBC # FLD: 12 % — SIGNIFICANT CHANGE UP (ref 10.3–14.5)
SODIUM SERPL-SCNC: 140 MMOL/L — SIGNIFICANT CHANGE UP (ref 135–145)
WBC # BLD: 8.5 K/UL — SIGNIFICANT CHANGE UP (ref 3.8–10.5)
WBC # FLD AUTO: 8.5 K/UL — SIGNIFICANT CHANGE UP (ref 3.8–10.5)

## 2017-06-16 PROCEDURE — C9600: CPT | Mod: LC

## 2017-06-16 PROCEDURE — 93458 L HRT ARTERY/VENTRICLE ANGIO: CPT | Mod: 59

## 2017-06-16 PROCEDURE — 83036 HEMOGLOBIN GLYCOSYLATED A1C: CPT

## 2017-06-16 PROCEDURE — 94640 AIRWAY INHALATION TREATMENT: CPT

## 2017-06-16 PROCEDURE — C1874: CPT

## 2017-06-16 PROCEDURE — 93010 ELECTROCARDIOGRAM REPORT: CPT

## 2017-06-16 PROCEDURE — C1725: CPT

## 2017-06-16 PROCEDURE — 94660 CPAP INITIATION&MGMT: CPT

## 2017-06-16 PROCEDURE — C1894: CPT

## 2017-06-16 PROCEDURE — C1887: CPT

## 2017-06-16 PROCEDURE — 85027 COMPLETE CBC AUTOMATED: CPT

## 2017-06-16 PROCEDURE — C1769: CPT

## 2017-06-16 PROCEDURE — 80053 COMPREHEN METABOLIC PANEL: CPT

## 2017-06-16 PROCEDURE — 80048 BASIC METABOLIC PNL TOTAL CA: CPT

## 2017-06-16 PROCEDURE — 93005 ELECTROCARDIOGRAM TRACING: CPT

## 2017-06-16 RX ORDER — CLOPIDOGREL BISULFATE 75 MG/1
1 TABLET, FILM COATED ORAL
Qty: 90 | Refills: 3 | OUTPATIENT
Start: 2017-06-16 | End: 2018-06-10

## 2017-06-16 RX ORDER — ACETAMINOPHEN 500 MG
2 TABLET ORAL
Qty: 0 | Refills: 0 | COMMUNITY
Start: 2017-06-16

## 2017-06-16 RX ADMIN — CLOPIDOGREL BISULFATE 75 MILLIGRAM(S): 75 TABLET, FILM COATED ORAL at 05:20

## 2017-06-16 RX ADMIN — LOSARTAN POTASSIUM 100 MILLIGRAM(S): 100 TABLET, FILM COATED ORAL at 05:20

## 2017-06-16 RX ADMIN — Medication 40 MILLIEQUIVALENT(S): at 07:36

## 2017-06-16 RX ADMIN — Medication 81 MILLIGRAM(S): at 05:20

## 2017-06-16 RX ADMIN — BUDESONIDE AND FORMOTEROL FUMARATE DIHYDRATE 2 PUFF(S): 160; 4.5 AEROSOL RESPIRATORY (INHALATION) at 05:19

## 2017-06-16 RX ADMIN — MONTELUKAST 10 MILLIGRAM(S): 4 TABLET, CHEWABLE ORAL at 11:05

## 2017-06-16 RX ADMIN — AMLODIPINE BESYLATE 5 MILLIGRAM(S): 2.5 TABLET ORAL at 05:20

## 2017-06-16 NOTE — DISCHARGE NOTE ADULT - PATIENT PORTAL LINK FT
“You can access the Follow[employer] Patient Portal, offered by [facility], by registering with the following website: [email or web address]”

## 2017-06-16 NOTE — DISCHARGE NOTE ADULT - CARE PROVIDER_API CALL
Sudeep Hamilton (MD), Cardiovascular Disease  [address] [place] [address]  Phone: [phone]  Fax: [phone]

## 2017-06-16 NOTE — DISCHARGE NOTE ADULT - CARE PLAN
Principal Discharge DX:	Coronary artery disease of native artery of native heart with stable angina pectoris  Goal:	Patient remains chest pain free and understands post cath discharge instructions.  Instructions for follow-up, activity and diet:	No heavy lifting or pushing/pulling with procedure arm for 2 weeks. No driving for 2 days. You may shower 24 hours following the procedure but avoid baths/swimming for 1 week. Check your wrist site for bleeding and/or swelling daily following procedure and call your doctor immediately if it occurs or if you experience increased pain at the site. Follow up with your cardiologist in 1-2 weeks. You may call [facility] if you have any questions/concerns regarding your procedure [phone]. Do not stop you Aspirin or Plavix unless instructed to do so by your cardiologist. Low salt, low fat diet.   Weight management.   Take medications as prescribed.    No smoking.  Follow up appointments with your doctor(s)  as instructed.  Secondary Diagnosis:	HTN (hypertension), benign  Goal:	Your blood pressure will be controlled.  Instructions for follow-up, activity and diet:	Continue with your blood pressure medications; eat a heart healthy diet with low salt diet; exercise regularly (consult with your physician or cardiologist first); maintain a heart healthy weight; if you smoke - quit (A resource to help you stop smoking is the [facility] [employer] – phone number [phone].); include healthy ways to manage stress. Continue to follow with your primary care physician or cardiologist.  Secondary Diagnosis:	Hyperlipidemia, unspecified hyperlipidemia type  Goal:	Your LDL cholesterol will be less than 70mg/dL  Instructions for follow-up, activity and diet:	Continue with your cholesterol medications. Eat a heart healthy diet that is low in saturated fats and salt, and includes whole grains, fruits, vegetables and lean protein; exercise regularly (consult with your physician or cardiologist first); maintain a heart healthy weight; if you smoke - quit (A resource to help you stop smoking is the [facility] [employer] – phone number [phone].). Continue to follow with your primary physician or cardiologist. Principal Discharge DX:	Coronary artery disease of native artery of native heart with stable angina pectoris  Goal:	Patient remains chest pain free and understands post cath discharge instructions.  Instructions for follow-up, activity and diet:	No heavy lifting or pushing/pulling with procedure arm for 2 weeks. No driving for 2 days. You may shower 24 hours following the procedure but avoid baths/swimming for 1 week. Check your wrist site for bleeding and/or swelling daily following procedure and call your doctor immediately if it occurs or if you experience increased pain at the site. Follow up with your cardiologist in 1-2 weeks. You may call [facility] if you have any questions/concerns regarding your procedure [phone]. Do not stop you Aspirin or Plavix unless instructed to do so by your cardiologist. Low salt, low fat diet.   Weight management.   Take medications as prescribed.    No smoking.  Follow up appointments with your doctor(s)  as instructed.  Secondary Diagnosis:	HTN (hypertension), benign  Goal:	Your blood pressure will be controlled.  Instructions for follow-up, activity and diet:	Continue with your blood pressure medications; eat a heart healthy diet with low salt diet; exercise regularly (consult with your physician or cardiologist first); maintain a heart healthy weight; if you smoke - quit (A resource to help you stop smoking is the [facility] [employer] – phone number [phone].); include healthy ways to manage stress. Continue to follow with your primary care physician or cardiologist.  Secondary Diagnosis:	Hyperlipidemia, unspecified hyperlipidemia type  Goal:	Your LDL cholesterol will be less than 70mg/dL  Instructions for follow-up, activity and diet:	Continue with your cholesterol medications. Eat a heart healthy diet that is low in saturated fats and salt, and includes whole grains, fruits, vegetables and lean protein; exercise regularly (consult with your physician or cardiologist first); maintain a heart healthy weight; if you smoke - quit (A resource to help you stop smoking is the [facility] [employer] – phone number [phone].). Continue to follow with your primary physician or cardiologist. Principal Discharge DX:	Coronary artery disease of native artery of native heart with stable angina pectoris  Goal:	Patient remains chest pain free and understands post cath discharge instructions.  Instructions for follow-up, activity and diet:	No heavy lifting or pushing/pulling with procedure arm for 2 weeks. No driving for 2 days. You may shower 24 hours following the procedure but avoid baths/swimming for 1 week. Check your wrist site for bleeding and/or swelling daily following procedure and call your doctor immediately if it occurs or if you experience increased pain at the site. Follow up with your cardiologist in 1-2 weeks. You may call [facility] if you have any questions/concerns regarding your procedure [phone]. Do not stop you Aspirin or Plavix unless instructed to do so by your cardiologist. Low salt, low fat diet.   Weight management.   Take medications as prescribed.    No smoking.  Follow up appointments with your doctor(s)  as instructed.  Secondary Diagnosis:	HTN (hypertension), benign  Goal:	Your blood pressure will be controlled.  Instructions for follow-up, activity and diet:	Continue with your blood pressure medications; eat a heart healthy diet with low salt diet; exercise regularly (consult with your physician or cardiologist first); maintain a heart healthy weight; if you smoke - quit (A resource to help you stop smoking is the [facility] [employer] – phone number [phone].); include healthy ways to manage stress. Continue to follow with your primary care physician or cardiologist.  Secondary Diagnosis:	Hyperlipidemia, unspecified hyperlipidemia type  Goal:	Your LDL cholesterol will be less than 70mg/dL  Instructions for follow-up, activity and diet:	Continue with your cholesterol medications. Eat a heart healthy diet that is low in saturated fats and salt, and includes whole grains, fruits, vegetables and lean protein; exercise regularly (consult with your physician or cardiologist first); maintain a heart healthy weight; if you smoke - quit (A resource to help you stop smoking is the [facility] [employer] – phone number [phone].). Continue to follow with your primary physician or cardiologist.  Goal:	No heavy lifting,  pushing, pulling with affected arm for 2 weeks.  No strenuous  activity  for 2 weeks. No sex for 1 week.  No driving for 2 days. You may shower 24 hours following procedure but avoid baths and swimming for 1 week. Check wrist site for bleeding and/or swelling daily following procedure. Call your doctor/cardiologist immediately should it occur or if you have increased/persistent pain at the site. Follow up with your cardiologist in 1- 2 weeks. You may call [facility] at [phone] or [phone] after office hours and weekends with any questions or concerns following your procedure.

## 2017-06-16 NOTE — DISCHARGE NOTE ADULT - HOSPITAL COURSE
HPI:  This is a 66 yo male PMH asthma well controlled, HTN, HLD, former smoker 22 pack years, obesity BMI 30.8 presented to cardiologist, Dr. Hamilton, with complaints of progressing episodes of exertional chest pressure radiating to jaw while walking relieved after few minutes of rest over the past month. Pt. also states he has had peripheral edema on occasion over past year.  Pt. denies SOB/RAHMAN, dizziness, diaphoresis, palpitations, nausea, vomiting, or syncope. Pt. states he went to an urgent care after one episode where he was given a Medrol dose pack.  Stress Echo 6/14/17 fair exercise tolerance, 1mm horizontal ST depression V3-V6, normal LV systolic function.  Images demonstrate entire apex, inferior and posterior walls became akinetic, all remaining wall segments became hyperdynamic.  Ischemia in the RCA/LAD distribution.  Pt. presents now asymptomatic for further evaluation and cardiac cath.     Antianginal regimen:  Amlodipine, given Ranexa 500 mg po x 1 (14 Jun 2017 16:27)    6/14 cardiac cath with one stent to the distal circ  6/15 cardiac cath with two stents to the prox LAD  right radial access sites without swelling, bleeding

## 2017-06-16 NOTE — PROGRESS NOTE ADULT - ATTENDING COMMENTS
Chau Lowery, [facility]    [phone]
Chau Lowery, [facility]    [phone]

## 2017-06-16 NOTE — PROGRESS NOTE ADULT - SUBJECTIVE AND OBJECTIVE BOX
Patient is a 67y old  Male who presents with a chief complaint of chest pain (16 Jun 2017 01:11)          Allergies    No Known Allergies    Intolerances    shellfish (Nausea)      Medications:  ranolazine 500milliGRAM(s) Oral once  aspirin enteric coated 81milliGRAM(s) Oral daily  atorvastatin 20milliGRAM(s) Oral at bedtime  ALBUTerol    90 MICROgram(s) HFA Inhaler 2Puff(s) Inhalation every 6 hours PRN  amLODIPine   Tablet 5milliGRAM(s) Oral daily  montelukast 10milliGRAM(s) Oral daily  potassium chloride    Tablet ER 40milliEquivalent(s) Oral daily  losartan 100milliGRAM(s) Oral daily  hydrochlorothiazide   Tablet 25milliGRAM(s) Oral daily  clopidogrel Tablet 75milliGRAM(s) Oral daily  buDESOnide 160 MICROgram(s)/formoterol 4.5 MICROgram(s) Inhaler 2Puff(s) Inhalation two times a day  acetaminophen   Tablet. 650milliGRAM(s) Oral every 6 hours PRN      Vitals:  T(C): 36.7, Max: 37.1 (06-15 @ 12:05)  HR: 61 (57 - 80)  BP: 116/76 (115/67 - 133/81)  BP(mean): --  RR: 17 (17 - 20)  SpO2: 96% (93% - 99%)  Wt(kg): --  Daily     Daily   I&O's Summary  I & Os for 24h ending 15 Ayaan 2017 07:00  =============================================  IN: 100 ml / OUT: 300 ml / NET: -200 ml    I & Os for current day (as of 16 Jun 2017 05:23)  =============================================  IN: 360 ml / OUT: 0 ml / NET: 360 ml        Physical Exam:  Appearance: Normal  Eyes: PERRL, EOMI  HENT: Normal oral muscosa, NC/AT  Cardiovascular: S1S2, RRR, No M/R/G, no JVD, No Lower extremity edema  Procedural Access Site: No hematoma, Non-tender to palpation, 2+ pulse, No bruit, No Ecchymosis  Respiratory: Clear to auscultation bilaterally  Gastrointestinal: Soft, Non tender, Normal Bowel Sounds  Musculoskeletal: No clubbing, No joint deformity   Neurologic: Non-focal  Lymphatic: No lymphadenopathy  Psychiatry: AAOx3, Mood & affect appropriate  Skin: No rashes, No ecchymoses, No cyanosis    06-16    140  |  102  |  21  ----------------------------<  98  3.1<L>   |  26  |  1.28    Ca    9.0      16 Jun 2017 04:32    TPro  7.9  /  Alb  4.6  /  TBili  0.9  /  DBili  x   /  AST  24  /  ALT  32  /  AlkPhos  101  06-14    Patient is on standing daily dose of KCl for hypokalemia            Lipid panel   Hgb A1c Hemoglobin A1C, Whole Blood: 5.5 % (06-15 @ 16:25)                          14.4   8.5   )-----------( 202      ( 16 Jun 2017 04:32 )             40.8         ECG: SB 56 bpm    Cath: one stent to the distal left circ 6/14, two stents to the prox LAD 6/15    Imaging:    Interpretation of Telemetry:

## 2017-06-16 NOTE — DISCHARGE NOTE ADULT - PLAN OF CARE
Patient remains chest pain free and understands post cath discharge instructions. No heavy lifting or pushing/pulling with procedure arm for 2 weeks. No driving for 2 days. You may shower 24 hours following the procedure but avoid baths/swimming for 1 week. Check your wrist site for bleeding and/or swelling daily following procedure and call your doctor immediately if it occurs or if you experience increased pain at the site. Follow up with your cardiologist in 1-2 weeks. You may call [facility] if you have any questions/concerns regarding your procedure [phone]. Do not stop you Aspirin or Plavix unless instructed to do so by your cardiologist. Low salt, low fat diet.   Weight management.   Take medications as prescribed.    No smoking.  Follow up appointments with your doctor(s)  as instructed. Your blood pressure will be controlled. Continue with your blood pressure medications; eat a heart healthy diet with low salt diet; exercise regularly (consult with your physician or cardiologist first); maintain a heart healthy weight; if you smoke - quit (A resource to help you stop smoking is the [facility] – phone number [phone].); include healthy ways to manage stress. Continue to follow with your primary care physician or cardiologist. Your LDL cholesterol will be less than 70mg/dL Continue with your cholesterol medications. Eat a heart healthy diet that is low in saturated fats and salt, and includes whole grains, fruits, vegetables and lean protein; exercise regularly (consult with your physician or cardiologist first); maintain a heart healthy weight; if you smoke - quit (A resource to help you stop smoking is the [facility] – phone number [phone].). Continue to follow with your primary physician or cardiologist. No heavy lifting,  pushing, pulling with affected arm for 2 weeks.  No strenuous  activity  for 2 weeks. No sex for 1 week.  No driving for 2 days. You may shower 24 hours following procedure but avoid baths and swimming for 1 week. Check wrist site for bleeding and/or swelling daily following procedure. Call your doctor/cardiologist immediately should it occur or if you have increased/persistent pain at the site. Follow up with your cardiologist in 1- 2 weeks. You may call [facility] at [phone] or [phone] after office hours and weekends with any questions or concerns following your procedure.

## 2017-06-16 NOTE — DISCHARGE NOTE ADULT - MEDICATION SUMMARY - MEDICATIONS TO TAKE
I will START or STAY ON the medications listed below when I get home from the hospital:    aspirin 81 mg oral tablet  -- 1 tab(s) by mouth once a day  -- Indication: For stented coronary artery    acetaminophen 325 mg oral tablet  -- 2 tab(s) by mouth every 6 hours, As needed, Mild Pain (1 - 3)  -- Indication: For mild pain    Crestor 5 mg oral tablet  -- 1 tab(s) by mouth once a day (at bedtime)  -- Indication: For Hyperlipidemia    losartan-hydrochlorothiazide 100mg-25mg oral tablet  -- 1 tab(s) by mouth once a day  -- Indication: For HTN (hypertension), benign    clopidogrel 75 mg oral tablet  -- 1 tab(s) by mouth once a day  -- Indication: For stented coronary artery    Advair Diskus 250 mcg-50 mcg inhalation powder  -- 1 puff(s) inhaled 2 times a day  -- Indication: For CoPD    ProAir HFA 90 mcg/inh inhalation aerosol  -- 2 puff(s) inhaled 4 times a day, As Needed  -- Indication: For CoPD    Norvasc 5 mg oral tablet  -- 1 tab(s) by mouth once a day  -- Indication: For HTN (hypertension), benign    Singulair 10 mg oral tablet  -- 1 tab(s) by mouth once a day  -- Indication: For Allergies    potassium chloride 20 mEq oral tablet, extended release  -- 2 tab(s) by mouth once a day  -- Indication: For potassium supplement I will START or STAY ON the medications listed below when I get home from the hospital:    aspirin 81 mg oral tablet  -- 1 tab(s) by mouth once a day  -- Indication: For stented coronary artery    acetaminophen 325 mg oral tablet  -- 2 tab(s) by mouth every 6 hours, As needed, Mild Pain (1 - 3)  -- Indication: For mild pain    Crestor 5 mg oral tablet  -- 1 tab(s) by mouth once a day (at bedtime)  -- Indication: For Hyperlipidemia    losartan-hydrochlorothiazide 100mg-25mg oral tablet  -- 1 tab(s) by mouth once a day  -- Indication: For HTN (hypertension), benign    clopidogrel 75 mg oral tablet  -- 1 tab(s) by mouth once a day  -- Indication: For stented coronary artery    Advair Diskus 250 mcg-50 mcg inhalation powder  -- 1 puff(s) inhaled 2 times a day  -- Indication: For Asthma    ProAir HFA 90 mcg/inh inhalation aerosol  -- 2 puff(s) inhaled 4 times a day, As Needed  -- Indication: For Asthma    Norvasc 5 mg oral tablet  -- 1 tab(s) by mouth once a day  -- Indication: For HTN (hypertension), benign    Singulair 10 mg oral tablet  -- 1 tab(s) by mouth once a day  -- Indication: For Allergies    potassium chloride 20 mEq oral tablet, extended release  -- 2 tab(s) by mouth once a day  -- Indication: For potassium supplement

## 2017-07-04 ENCOUNTER — EMERGENCY (EMERGENCY)
Facility: HOSPITAL | Age: 67
LOS: 1 days | Discharge: HOME | End: 2017-07-04
Attending: EMERGENCY MEDICINE | Admitting: EMERGENCY MEDICINE
Payer: MEDICARE

## 2017-07-04 VITALS
DIASTOLIC BLOOD PRESSURE: 82 MMHG | SYSTOLIC BLOOD PRESSURE: 135 MMHG | OXYGEN SATURATION: 95 % | HEART RATE: 85 BPM | WEIGHT: 227.08 LBS | TEMPERATURE: 98 F | HEIGHT: 73 IN | RESPIRATION RATE: 14 BRPM

## 2017-07-04 DIAGNOSIS — Z98.890 OTHER SPECIFIED POSTPROCEDURAL STATES: ICD-10-CM

## 2017-07-04 DIAGNOSIS — I25.10 ATHEROSCLEROTIC HEART DISEASE OF NATIVE CORONARY ARTERY WITHOUT ANGINA PECTORIS: Chronic | ICD-10-CM

## 2017-07-04 DIAGNOSIS — Z79.82 LONG TERM (CURRENT) USE OF ASPIRIN: ICD-10-CM

## 2017-07-04 DIAGNOSIS — J45.909 UNSPECIFIED ASTHMA, UNCOMPLICATED: ICD-10-CM

## 2017-07-04 DIAGNOSIS — H66.002 ACUTE SUPPURATIVE OTITIS MEDIA WITHOUT SPONTANEOUS RUPTURE OF EAR DRUM, LEFT EAR: ICD-10-CM

## 2017-07-04 DIAGNOSIS — E78.5 HYPERLIPIDEMIA, UNSPECIFIED: ICD-10-CM

## 2017-07-04 DIAGNOSIS — I10 ESSENTIAL (PRIMARY) HYPERTENSION: ICD-10-CM

## 2017-07-04 DIAGNOSIS — Z91.013 ALLERGY TO SEAFOOD: ICD-10-CM

## 2017-07-04 DIAGNOSIS — Z87.891 PERSONAL HISTORY OF NICOTINE DEPENDENCE: ICD-10-CM

## 2017-07-04 PROBLEM — R60.0 LOCALIZED EDEMA: Chronic | Status: ACTIVE | Noted: 2017-06-14

## 2017-07-04 PROCEDURE — 99284 EMERGENCY DEPT VISIT MOD MDM: CPT

## 2017-07-04 PROCEDURE — 99283 EMERGENCY DEPT VISIT LOW MDM: CPT

## 2017-07-04 RX ORDER — ALBUTEROL 90 UG/1
2 AEROSOL, METERED ORAL
Qty: 0 | Refills: 0 | COMMUNITY

## 2017-07-04 RX ORDER — MONTELUKAST 4 MG/1
1 TABLET, CHEWABLE ORAL
Qty: 0 | Refills: 0 | COMMUNITY

## 2017-07-04 RX ORDER — POTASSIUM CHLORIDE 20 MEQ
2 PACKET (EA) ORAL
Qty: 0 | Refills: 0 | COMMUNITY

## 2017-07-04 RX ORDER — AMOXICILLIN 250 MG/5ML
1 SUSPENSION, RECONSTITUTED, ORAL (ML) ORAL
Qty: 14 | Refills: 0 | OUTPATIENT
Start: 2017-07-04 | End: 2017-07-11

## 2017-07-04 RX ORDER — ROSUVASTATIN CALCIUM 5 MG/1
1 TABLET ORAL
Qty: 0 | Refills: 0 | COMMUNITY

## 2017-07-04 RX ORDER — ASPIRIN/CALCIUM CARB/MAGNESIUM 324 MG
1 TABLET ORAL
Qty: 0 | Refills: 0 | COMMUNITY

## 2017-07-04 RX ORDER — AMOXICILLIN 250 MG/5ML
750 SUSPENSION, RECONSTITUTED, ORAL (ML) ORAL ONCE
Qty: 0 | Refills: 0 | Status: COMPLETED | OUTPATIENT
Start: 2017-07-04 | End: 2017-07-04

## 2017-07-04 RX ORDER — FLUTICASONE PROPIONATE AND SALMETEROL 50; 250 UG/1; UG/1
1 POWDER ORAL; RESPIRATORY (INHALATION)
Qty: 0 | Refills: 0 | COMMUNITY

## 2017-07-04 RX ADMIN — Medication 750 MILLIGRAM(S): at 16:24

## 2017-07-04 NOTE — ED PROVIDER NOTE - PROGRESS NOTE DETAILS
Discussed with Patent and/or Family regarding the nature of the patient's infection.  At length discussion regarding the signs and symptoms of a persistent or worsening infection, the importance of close, prompt medical follow-up, and infection / fever precautions including when to immediately return to the emergency department.  An opportunity to ask questions was given and understanding of all instructions was verbalized.

## 2017-07-04 NOTE — ED PROVIDER NOTE - OBJECTIVE STATEMENT
68 yo M p/w ~ 1 day of Left ear pain / discomfort. No discharge. no fever/chills. No weak / dizzy / malaise. no HA/n/v/dizzy. No visual changes. No agg/allev factors. No recent trauma. No other inj or co

## 2017-07-04 NOTE — ED PROVIDER NOTE - ENMT, MLM
Airway patent, Nasal mucosa clear. Mouth with normal mucosa. Throat has no vesicles, no oropharyngeal exudates and uvula is midline. MM Moist. L TM bulging, eythematous. R Tm nl. Nl canal bl, nl mastoid bl.

## 2017-07-04 NOTE — ED PROVIDER NOTE - CHPI ED SYMPTOMS NEG
no nausea/no loss of consciousness/no chills/no weakness/no syncope/no numbness/no vomiting/no blurred vision/no change in level of consciousness/no fever

## 2018-10-30 ENCOUNTER — TRANSCRIPTION ENCOUNTER (OUTPATIENT)
Age: 68
End: 2018-10-30

## 2018-10-31 ENCOUNTER — RESULT REVIEW (OUTPATIENT)
Age: 68
End: 2018-10-31

## 2018-10-31 ENCOUNTER — OUTPATIENT (OUTPATIENT)
Dept: OUTPATIENT SERVICES | Facility: HOSPITAL | Age: 68
LOS: 1 days | End: 2018-10-31
Payer: MEDICARE

## 2018-10-31 DIAGNOSIS — I25.10 ATHEROSCLEROTIC HEART DISEASE OF NATIVE CORONARY ARTERY WITHOUT ANGINA PECTORIS: Chronic | ICD-10-CM

## 2018-10-31 DIAGNOSIS — Z12.11 ENCOUNTER FOR SCREENING FOR MALIGNANT NEOPLASM OF COLON: ICD-10-CM

## 2018-10-31 PROCEDURE — 45380 COLONOSCOPY AND BIOPSY: CPT | Mod: PT

## 2018-10-31 PROCEDURE — 88305 TISSUE EXAM BY PATHOLOGIST: CPT | Mod: 26

## 2018-10-31 PROCEDURE — 88305 TISSUE EXAM BY PATHOLOGIST: CPT

## 2018-11-01 LAB — SURGICAL PATHOLOGY FINAL REPORT - CH: SIGNIFICANT CHANGE UP

## 2019-07-07 ENCOUNTER — EMERGENCY (EMERGENCY)
Facility: HOSPITAL | Age: 69
LOS: 1 days | Discharge: HOME | End: 2019-07-07
Attending: STUDENT IN AN ORGANIZED HEALTH CARE EDUCATION/TRAINING PROGRAM
Payer: MEDICARE

## 2019-07-07 VITALS
SYSTOLIC BLOOD PRESSURE: 197 MMHG | TEMPERATURE: 98 F | OXYGEN SATURATION: 98 % | RESPIRATION RATE: 20 BRPM | DIASTOLIC BLOOD PRESSURE: 100 MMHG | HEART RATE: 90 BPM

## 2019-07-07 VITALS
SYSTOLIC BLOOD PRESSURE: 163 MMHG | DIASTOLIC BLOOD PRESSURE: 92 MMHG | OXYGEN SATURATION: 98 % | RESPIRATION RATE: 20 BRPM | HEART RATE: 62 BPM | TEMPERATURE: 98 F

## 2019-07-07 DIAGNOSIS — I25.10 ATHEROSCLEROTIC HEART DISEASE OF NATIVE CORONARY ARTERY WITHOUT ANGINA PECTORIS: Chronic | ICD-10-CM

## 2019-07-07 PROCEDURE — 99283 EMERGENCY DEPT VISIT LOW MDM: CPT

## 2019-07-07 PROCEDURE — 99284 EMERGENCY DEPT VISIT MOD MDM: CPT | Mod: GC

## 2019-07-07 PROCEDURE — 93005 ELECTROCARDIOGRAM TRACING: CPT

## 2019-07-07 PROCEDURE — 93010 ELECTROCARDIOGRAM REPORT: CPT

## 2019-07-07 NOTE — ED PROVIDER NOTE - CLINICAL SUMMARY MEDICAL DECISION MAKING FREE TEXT BOX
h.o htn, p.w elevated b/p w.o chestpain, shortness of breath, n/v, headache, lightheadedness, dizziness. Intermittent headache for the last few days resolved with medication. Not persistent headache or worse in the lifetime. Belching is mostly GI issues w.o n/v. Baseline otherwise. No sign of ICH and neg neuro. will get ekg for screening and d/c home w. return precautions.

## 2019-07-07 NOTE — ED PROVIDER NOTE - NSFOLLOWUPINSTRUCTIONS_ED_ALL_ED_FT
Thank you for visiting our Emergency Department, it has been a pleasure taking part in your healthcare. Please follow up with your PMD within x48 hours.    Your discharge diagnosis is: elevated blood pressure.    Return precautions to the Emergency Department include but are not limited to: unrelenting nausea, vomiting, fever, chills, chest pain, shortness of breath, dizziness, abdominal pain, worsening pain, syncope, blood in urine or stool, headache that doesn't resolve, numbness or tingling, loss of sensation, loss of motor function, or any other concerning symptoms.

## 2019-07-07 NOTE — ED ADULT NURSE REASSESSMENT NOTE - NS ED NURSE REASSESS COMMENT FT1
MD Chen made aware of patient's elevated BP, no RN interventions needed at this time. Patient resting comfortably in stretcher, denies headache/vision changes/CP/SOB/dizziness/nausea/vomiting. Comfort and safety measures provided. Will continue to monitor and reassess.

## 2019-07-07 NOTE — ED ADULT NURSE NOTE - OBJECTIVE STATEMENT
69 year old male comes to the ED c/o high BP. Patient has a PMH of CAD with stents, HTN and chronic back pain. Patient states he currently take amlodipine and losartan for BP and is compliant with his medications. Patient states he began to experience a "dull headache" a few days ago that occurs intermittantly and decided to take his BP which resulted with a BP in the 170s-180s. Patient states his baseline is usually around the 120s/80s. Patient is a/ox3, hypertensive, ambulatory, able to move all extremities, sensory intact, PERRL, follow commands and in NAD at this time. Patient denies CP/SOB, n/v/d, vision changes/photophobia, dizziness/lightheadedness, numbness/tingling/weakness. Lung sounds clear and equal b/l with no labored breathing, abdomen soft, NT/ND, peripheral pulses strong and equal b/l with no edema noted, skin warm, dry and intact.

## 2019-07-07 NOTE — ED PROVIDER NOTE - NS ED ROS FT
GENERAL: No fever or chills, weight changes, nightsweats  EYES: no change in vision  HEENT: no dysplasia, odynophagia, ear pain, rhinorrhea, epistasis   CARDIAC: no chest pain, palpitation   PULMONARY: no cough or SOB  GI: no abdominal pain, no nausea or no vomiting, no diarrhea or constipation  : No changes in urination for pain/freq.   SKIN: no rashes   NEURO: no numbness/tingling, extremity weakness   MSK: No joint pain

## 2019-07-07 NOTE — ED PROVIDER NOTE - PHYSICAL EXAMINATION
General: NAD, good hygiene, well developed  HENT: Atraumatic, EMOI, no conjunctivae injection, moist mucosa, no post. oropharynx erythema, exudates  Neck: normal ROM and trachea midline   Cardiovascular: RRR, S1&2, no M or R, radial pulses equal and b/l  Respiratory: CTABL, no wheezes or crackles, no decreased breath sounds  Abdominal: soft and non-tender non distended, neg for guarding, wade's sign, rovsing's sign, mcburney's sign, no CVA tenderness   Extremities: no edema of the legs/feet, DP/PT equal b/l  Skin: warm, well perfused  Neuro: CN2-12 intact, EOMI. 5/5 strength in UE and LE b/l.  Sensation intact in UE/LE b/l. Neg for pronator drift, normal finger to nose, romberg, and normal gait   Psych: normal mood and affect

## 2019-07-07 NOTE — ED PROVIDER NOTE - OBJECTIVE STATEMENT
69M, h.o Stent (2x), HTN, p.w elevated bp of 170s today. Baseline of 120s. Pt denied any change in medication. No chestpain, shortness of breath, vision change, urine changes. Pt states intermittent headache resolved with OTC pain medication for the last 3 days. Some betching today, no n/v. baseline otherwise.

## 2019-07-07 NOTE — ED PROVIDER NOTE - ATTENDING CONTRIBUTION TO CARE
69 M w/ PMH asthma well controlled, HTN, HLD, former smoker 22 pack years, obesity BMI 30.8 presented to cardiologist, Dr. Hamilton, p/w elevate BP, asymptomatic, well appearing  EKG nonischemic 69 M w/ PMH asthma well controlled, HTN, HLD, former smoker 22 pack years, obesity BMI 30.8 presented to cardiologist, Dr. Hamilton, p/w elevate BP, asymptomatic, well appearing  EKG nonischemic, pt has no cp, lightheadedness, abdominal pain, nausea vomiting, headache wasn't sudden in onset rates it a 2/10, reports increased caffeine intake today,  Pt given counseling to follow up with PCP, to return to the ED if he develops weakness, numbness, slurred speech, abdominal pain, chest pain, sob, cp. Pt and wife verbalized understanding regarding the plan

## 2019-07-08 ENCOUNTER — TRANSCRIPTION ENCOUNTER (OUTPATIENT)
Age: 69
End: 2019-07-08

## 2020-08-12 ENCOUNTER — EMERGENCY (EMERGENCY)
Facility: HOSPITAL | Age: 70
LOS: 1 days | Discharge: HOME | End: 2020-08-12
Attending: EMERGENCY MEDICINE | Admitting: EMERGENCY MEDICINE
Payer: MEDICARE

## 2020-08-12 VITALS
WEIGHT: 216.93 LBS | OXYGEN SATURATION: 98 % | HEIGHT: 73 IN | HEART RATE: 66 BPM | SYSTOLIC BLOOD PRESSURE: 188 MMHG | TEMPERATURE: 98 F | DIASTOLIC BLOOD PRESSURE: 92 MMHG | RESPIRATION RATE: 18 BRPM

## 2020-08-12 VITALS
OXYGEN SATURATION: 98 % | SYSTOLIC BLOOD PRESSURE: 146 MMHG | RESPIRATION RATE: 17 BRPM | TEMPERATURE: 98 F | DIASTOLIC BLOOD PRESSURE: 86 MMHG | HEART RATE: 63 BPM

## 2020-08-12 DIAGNOSIS — I25.10 ATHEROSCLEROTIC HEART DISEASE OF NATIVE CORONARY ARTERY WITHOUT ANGINA PECTORIS: Chronic | ICD-10-CM

## 2020-08-12 LAB
ALBUMIN SERPL ELPH-MCNC: 3.9 G/DL — SIGNIFICANT CHANGE UP (ref 3.3–5)
ALP SERPL-CCNC: 92 U/L — SIGNIFICANT CHANGE UP (ref 40–120)
ALT FLD-CCNC: 31 U/L — SIGNIFICANT CHANGE UP (ref 12–78)
ANION GAP SERPL CALC-SCNC: 6 MMOL/L — SIGNIFICANT CHANGE UP (ref 5–17)
AST SERPL-CCNC: 25 U/L — SIGNIFICANT CHANGE UP (ref 15–37)
BASOPHILS # BLD AUTO: 0.05 K/UL — SIGNIFICANT CHANGE UP (ref 0–0.2)
BASOPHILS NFR BLD AUTO: 0.6 % — SIGNIFICANT CHANGE UP (ref 0–2)
BILIRUB SERPL-MCNC: 0.7 MG/DL — SIGNIFICANT CHANGE UP (ref 0.2–1.2)
BUN SERPL-MCNC: 21 MG/DL — SIGNIFICANT CHANGE UP (ref 7–23)
CALCIUM SERPL-MCNC: 8.5 MG/DL — SIGNIFICANT CHANGE UP (ref 8.5–10.1)
CHLORIDE SERPL-SCNC: 108 MMOL/L — SIGNIFICANT CHANGE UP (ref 96–108)
CO2 SERPL-SCNC: 29 MMOL/L — SIGNIFICANT CHANGE UP (ref 22–31)
CREAT SERPL-MCNC: 1.2 MG/DL — SIGNIFICANT CHANGE UP (ref 0.5–1.3)
EOSINOPHIL # BLD AUTO: 0.26 K/UL — SIGNIFICANT CHANGE UP (ref 0–0.5)
EOSINOPHIL NFR BLD AUTO: 2.9 % — SIGNIFICANT CHANGE UP (ref 0–6)
GLUCOSE SERPL-MCNC: 154 MG/DL — HIGH (ref 70–99)
HCT VFR BLD CALC: 43.6 % — SIGNIFICANT CHANGE UP (ref 39–50)
HGB BLD-MCNC: 15.1 G/DL — SIGNIFICANT CHANGE UP (ref 13–17)
IMM GRANULOCYTES NFR BLD AUTO: 0.3 % — SIGNIFICANT CHANGE UP (ref 0–1.5)
LIDOCAIN IGE QN: 236 U/L — SIGNIFICANT CHANGE UP (ref 73–393)
LYMPHOCYTES # BLD AUTO: 1.3 K/UL — SIGNIFICANT CHANGE UP (ref 1–3.3)
LYMPHOCYTES # BLD AUTO: 14.5 % — SIGNIFICANT CHANGE UP (ref 13–44)
MCHC RBC-ENTMCNC: 29.4 PG — SIGNIFICANT CHANGE UP (ref 27–34)
MCHC RBC-ENTMCNC: 34.6 GM/DL — SIGNIFICANT CHANGE UP (ref 32–36)
MCV RBC AUTO: 85 FL — SIGNIFICANT CHANGE UP (ref 80–100)
MONOCYTES # BLD AUTO: 0.62 K/UL — SIGNIFICANT CHANGE UP (ref 0–0.9)
MONOCYTES NFR BLD AUTO: 6.9 % — SIGNIFICANT CHANGE UP (ref 2–14)
NEUTROPHILS # BLD AUTO: 6.7 K/UL — SIGNIFICANT CHANGE UP (ref 1.8–7.4)
NEUTROPHILS NFR BLD AUTO: 74.8 % — SIGNIFICANT CHANGE UP (ref 43–77)
NRBC # BLD: 0 /100 WBCS — SIGNIFICANT CHANGE UP (ref 0–0)
PLATELET # BLD AUTO: 203 K/UL — SIGNIFICANT CHANGE UP (ref 150–400)
POTASSIUM SERPL-MCNC: 3.6 MMOL/L — SIGNIFICANT CHANGE UP (ref 3.5–5.3)
POTASSIUM SERPL-SCNC: 3.6 MMOL/L — SIGNIFICANT CHANGE UP (ref 3.5–5.3)
PROT SERPL-MCNC: 7.1 G/DL — SIGNIFICANT CHANGE UP (ref 6–8.3)
RBC # BLD: 5.13 M/UL — SIGNIFICANT CHANGE UP (ref 4.2–5.8)
RBC # FLD: 13.2 % — SIGNIFICANT CHANGE UP (ref 10.3–14.5)
SODIUM SERPL-SCNC: 143 MMOL/L — SIGNIFICANT CHANGE UP (ref 135–145)
TROPONIN I SERPL-MCNC: <.015 NG/ML — SIGNIFICANT CHANGE UP (ref 0.01–0.04)
TROPONIN I SERPL-MCNC: <.015 NG/ML — SIGNIFICANT CHANGE UP (ref 0.01–0.04)
WBC # BLD: 8.96 K/UL — SIGNIFICANT CHANGE UP (ref 3.8–10.5)
WBC # FLD AUTO: 8.96 K/UL — SIGNIFICANT CHANGE UP (ref 3.8–10.5)

## 2020-08-12 PROCEDURE — 93005 ELECTROCARDIOGRAM TRACING: CPT

## 2020-08-12 PROCEDURE — 36415 COLL VENOUS BLD VENIPUNCTURE: CPT

## 2020-08-12 PROCEDURE — 84484 ASSAY OF TROPONIN QUANT: CPT

## 2020-08-12 PROCEDURE — 85027 COMPLETE CBC AUTOMATED: CPT

## 2020-08-12 PROCEDURE — 93010 ELECTROCARDIOGRAM REPORT: CPT

## 2020-08-12 PROCEDURE — 70450 CT HEAD/BRAIN W/O DYE: CPT

## 2020-08-12 PROCEDURE — 99284 EMERGENCY DEPT VISIT MOD MDM: CPT | Mod: 25

## 2020-08-12 PROCEDURE — 71045 X-RAY EXAM CHEST 1 VIEW: CPT

## 2020-08-12 PROCEDURE — 71045 X-RAY EXAM CHEST 1 VIEW: CPT | Mod: 26

## 2020-08-12 PROCEDURE — 99285 EMERGENCY DEPT VISIT HI MDM: CPT

## 2020-08-12 PROCEDURE — 83690 ASSAY OF LIPASE: CPT

## 2020-08-12 PROCEDURE — 80053 COMPREHEN METABOLIC PANEL: CPT

## 2020-08-12 PROCEDURE — 70450 CT HEAD/BRAIN W/O DYE: CPT | Mod: 26

## 2020-08-12 RX ORDER — ASPIRIN/CALCIUM CARB/MAGNESIUM 324 MG
81 TABLET ORAL ONCE
Refills: 0 | Status: COMPLETED | OUTPATIENT
Start: 2020-08-12 | End: 2020-08-12

## 2020-08-12 RX ADMIN — Medication 81 MILLIGRAM(S): at 03:33

## 2020-08-12 NOTE — ED ADULT TRIAGE NOTE - CHIEF COMPLAINT QUOTE
States he woke up from sleep dizziness which resolved shortly after. Denies any dizziness right now, no chest pain. States he woke up from sleep dizzy which resolved shortly after. Denies any dizziness right now, no chest pain.

## 2020-08-12 NOTE — ED ADULT NURSE NOTE - CHIEF COMPLAINT QUOTE
States he woke up from sleep dizzy which resolved shortly after. Denies any dizziness right now, no chest pain.

## 2020-08-12 NOTE — ED ADULT NURSE NOTE - CHPI ED NUR SYMPTOMS NEG
no tingling/no pain/no decreased eating/drinking/no weakness/no vomiting/no fever/no nausea/no chills

## 2020-08-12 NOTE — ED PROVIDER NOTE - CARE PROVIDER_API CALL
Mike Mensah  NEUROLOGY  [address] [place] [address]  Phone: [phone]  Fax: [phone]  Follow Up Time: 1-3 Days    Destiny Enriquez  NEUROLOGY  [address] [place] [address]  Phone: [phone]  Fax: [phone]  Follow Up Time: 1-3 Days    Sudeep Hamilton  CARDIOVASCULAR DISEASE  [address] [place] [address]  Phone: [phone]  Fax: [phone]  Established Patient  Follow Up Time: 1-3 Days

## 2020-08-12 NOTE — ED PROVIDER NOTE - PROVIDER TOKENS
PROVIDER:[TOKEN:[[id number]],FOLLOWUP:[1-3 Days]],PROVIDER:[TOKEN:[[id number]],FOLLOWUP:[1-3 Days]],PROVIDER:[TOKEN:[[id number]],FOLLOWUP:[1-3 Days],ESTABLISHEDPATIENT:[T]]

## 2020-08-12 NOTE — CONSULT NOTE ADULT - SUBJECTIVE AND OBJECTIVE BOX
CHIEF COMPLAINT: Patient is a 70y old  Male who presents with a chief complaint of     HPI  pateint was awoken at 2 am with sweating, lighheaseness, mild abdominal discomfort. Has had milder episdes in past after eating hot dogs and beans late at night which he did last evening. symptoms persisted  and came to ER, of note he had a bowel movement before he came to er and felt better      PAST MEDICAL & SURGICAL HISTORY:  Asthma  Former smoker  Lower extremity edema  HLD (hyperlipidemia)  Lumbar herniated disc: l4  Hypertension  CAD S/P percutaneous coronary angioplasty: 2 cardiac stents  H/O knee surgery: x3      SOCIAL HISTORY:    FAMILY HISTORY: FAMILY HISTORY:      MEDICATIONS  (STANDING):    MEDICATIONS  (PRN):      Allergies    No Known Allergies    Intolerances    shellfish (Nausea)      REVIEW OF SYSTEMS:    CONSTITUTIONAL: No weakness, fevers or chills  EYES: No visual changes, No diplopia  ENMT: No throat pain , No exudate  NECK: No pain or stiffness  RESPIRATORY: No cough, wheezing, hemoptysis; No shortness of breath  CARDIOVASCULAR: No chest pain or palpitations  GASTROINTESTINAL: No abdominal pain. No nausea, vomiting, or hematemesis; No diarrhea or constipation. No melena or hematochezia.  GENITOURINARY: No dysuria, frequency or hematuria  NEUROLOGICAL: No numbness or weakness  SKIN: No itching or rash  All other review of systems is negative unless indicated above    VITAL SIGNS:   Vital Signs Last 24 Hrs  T(C): 36.9 (12 Aug 2020 07:30), Max: 36.9 (12 Aug 2020 07:30)  T(F): 98.4 (12 Aug 2020 07:30), Max: 98.4 (12 Aug 2020 07:30)  HR: 60 (12 Aug 2020 07:30) (60 - 66)  BP: 130/72 (12 Aug 2020 07:30) (121/66 - 188/92)  BP(mean): --  RR: 18 (12 Aug 2020 07:30) (18 - 18)  SpO2: 97% (12 Aug 2020 07:30) (96% - 98%)    I&O's Summary      PHYSICAL EXAM:    Constitutional: NAD, awake and alert, well-developed  Eyes:  EOMI,  Pupils round, no lesions  ENMT: no exudate or erythema  Pulmonary: Non-labored, breath sounds are clear bilaterally, No wheezing, rales or rhonchi  Cardiovascular: PMI not palpable non-displaced Regular S1 and S2, no murmurs, rubs, gallops or clicks  Gastrointestinal: Bowel Sounds present, soft, nontender.   Lymph: No peripheral edema. No cervical lymphadenopathy.  Neurological: Alert, no focal deficits  Skin: No rashes. Changes of chronic venous stasis. No cyanosis.  Psych:  Mood & affect appropriate Confused.    LABS: All Labs Reviewed:                        15.1   8.96  )-----------( 203      ( 12 Aug 2020 03:21 )             43.6     12 Aug 2020 03:21    143    |  108    |  21     ----------------------------<  154    3.6     |  29     |  1.20     Ca    8.5        12 Aug 2020 03:21    TPro  7.1    /  Alb  3.9    /  TBili  0.7    /  DBili  x      /  AST  25     /  ALT  31     /  AlkPhos  92     12 Aug 2020 03:21      CARDIAC MARKERS ( 12 Aug 2020 07:38 )  <.015 ng/mL / x     / x     / x     / x      CARDIAC MARKERS ( 12 Aug 2020 03:21 )  <.015 ng/mL / x     / x     / x     / x          Blood Culture:         RADIOLOGY/EKG:

## 2020-08-12 NOTE — ED PROVIDER NOTE - PROGRESS NOTE DETAILS
[facility] will eval pt dr harriett bruno pt cleared from neurology. follow up  with dr varela and/ot dr danielle love dr frias st pt cleared for discharge. will follow up in office with dr mota for further work up

## 2020-08-12 NOTE — ED PROVIDER NOTE - PHYSICAL EXAMINATION
Gen: Alert, NAD  Head/eyes: NC/AT, PERRL, EOMI, normal lids/conjunctiva, no scleral icterus  ENT: airway patent  Neck: supple, no tenderness/meningismus/JVD, Trachea midline  Pulm/lung: Bilateral clear BS, normal resp effort, no wheeze/stridor/retractions  CV/heart: RRR, no M/R/G, +2 dist pulses (radial, pedal DP/PT, popliteal)  GI/Abd: soft, NT/ND, +BS, no guarding/rebound tenderness  Musculoskeletal: no edema/erythema/cyanosis, FROM in all extremities, no C/T/L spine ttp  Skin: no rash, no vesicles, no petechaie, no ecchymosis, no swelling  Neuro: AAOx3, CN 2-12 intact, normal sensation, 5/5 motor strength in all extremities, normal gait, no dysmetria. NIHS = 0

## 2020-08-12 NOTE — ED PROVIDER NOTE - OBJECTIVE STATEMENT
71 yo male hx of asthma hld, CAD with 2 stents c/o sudden onset dizziness 1 hour ago, went to sleep around 11am, as per wife patient appeared to slurred his speech and kept saying he felt dizzy, now feels well, has no symptoms, here for evaluation. Ambulating without any difficulty. Denies any chest pain, sob.  No fever/chills.  wife gave him 81mg ASA PTA.        cards Dr. Hamilton

## 2020-08-12 NOTE — CONSULT NOTE ADULT - ASSESSMENT
No chest pain  no ecg changes,  negative troponins  most likely vasovagal episode with GI upset  Follow up with Dr Hamilton and outpatient stress.

## 2020-08-12 NOTE — ED PROVIDER NOTE - PATIENT PORTAL LINK FT
You can access the FollowMyHealth Patient Portal offered by [facility] by registering at the following website: [email or web address]. By joining [employer]’s FollowMyHealth portal, you will also be able to view your health information using other applications (apps) compatible with our system.

## 2020-08-12 NOTE — ED PROVIDER NOTE - NS ED ROS FT

## 2020-08-12 NOTE — ED PROVIDER NOTE - CARE PLAN
Principal Discharge DX:	Dizziness  Secondary Diagnosis:	CAD S/P percutaneous coronary angioplasty  Secondary Diagnosis:	Hypertension  Secondary Diagnosis:	HLD (hyperlipidemia)

## 2020-08-12 NOTE — ED PROVIDER NOTE - NSFOLLOWUPINSTRUCTIONS_ED_ALL_ED_FT
Dizziness    Dizziness can manifest as a feeling of unsteadiness or light-headedness. You may feel like you are about to faint. This condition can be caused by a number of things, including medicines, dehydration, or illness. Drink enough fluid to keep your urine clear or pale yellow. Do not drink alcohol and limit your caffeine intake. Avoid quick or sudden movements.  Rise slowly from chairs and steady yourself until you feel okay. In the morning, first sit up on the side of the bed.    SEEK IMMEDIATE MEDICAL CARE IF YOU HAVE ANY OF THE FOLLOWING SYMPTOMS: vomiting, changes in your vision or speech, weakness in your arms or legs, trouble speaking or swallowing, chest pain, abdominal pain, shortness of breath, sweating, bleeding, headache, neck pain, or fever.     Please return to the Emergency Department immediately for any problems or concerns.

## 2020-08-12 NOTE — ED ADULT NURSE NOTE - OBJECTIVE STATEMENT
69 y/o M patient presents to ED from home c/o dizziness this morning. As per patient he woke up suddenly from sleep after a nightmare with sudden onset dizziness. Patient reports dizziness lasted 5 minutes. Patient's wife reports she gave him aspirin 81 mg at home prior to arrival to ED. Upon arrival to ED patient reports dizziness subsided. Patient A&Ox4. lungs CTA. skin warm and intact. abdomen soft. Patient denies HA, SOB, chest pain, abdominal pain, N/V/D. Safety and comfort measures provided and maintained. Wife bedside.

## 2020-11-16 NOTE — ED ADULT NURSE NOTE - PAIN: BODY LOCATION
----- Message from Kala Kaur MD sent at 11/15/2020  7:48 PM CST -----  Call   Cholesterol ( ldl looks at goal  )  Continue current medications   If has myalgias inspite of coq 10 200 mg , to call me   chest

## 2022-04-21 NOTE — DISCHARGE NOTE ADULT - NSTOBACCONEVERSMOKERY/N_GEN_A
Pt tested positive for COVID today. She knows the quarantine protocol. She was advised of what OTC meds she can do for symptoms and verbalized understanding. Advised that there are not any monoclonal antibodies being given at this time.    Yes, Non-Core measure site...

## 2022-12-29 NOTE — ED ADULT NURSE NOTE - NS ED PATIENT SAFETY CONCERN
[FreeTextEntry6] : Indication:  Unable to examine nasal passages and paranasal sinus drainage adequately with anterior rhinoscopy.\par The patient has nasal congestion\par \par Sinus endoscope # 47\par Nasal septum exam shows   mild  septal deviation\par The inferior nasal turbinates are moderate in size with normal mucosa.\par The sinus endoscope was introduced into the right nares.   \par exam right middle meatus reveals 2+ mucopus.  There is moderate inflammation present in the middle meatus and involving the middle turbinate.\par The scope was advanced and the sphenoethmoid region was inspected.\par The superior meatus and nasal vault are unremarkable.  The nasopharynx is unremarkable without inflammation or mass\par The sinus endoscope was introduced into the left nares.\par Examination of the left middle meatus reveals 2+ mucopus with moderate inflammation of the middle turbinate.\par The scope was advanced and the sphenoethmoid region was inspected.\par The left superior meatus and nasal vault are unremarkable.  \par The fiberoptic scope was introduced to the posterior pharynx and exam of the endolarynx is wnl w good vocal cord mobility.\par No lesion noted in hypopharynx.  There is erythema  moderate edema of posterior larynx.\par raspy voice\par  No

## 2023-01-09 NOTE — ED ADULT TRIAGE NOTE - NS ED NURSE BANDS TYPE
Allergy; Glycopyrrolate Counseling:  I discussed with the patient the risks of glycopyrrolate including but not limited to skin rash, drowsiness, dry mouth, difficulty urinating, and blurred vision.

## 2023-02-06 NOTE — ED ADULT NURSE NOTE - NS ED NURSE RECORD ANOTHER VITAL SIGN
Pt w/ recent acute sinusitis with persistent sinus headaches and TTP of maxillary and frontal sinuses s/p amoxicillin  Patient to do nasal rinses for the next 2 weeks   OK to take  over the counter zyrtec   Prescribed augmentin x 10 days  Precautions given  Patient to follow up with PCP   
No

## 2023-04-14 ENCOUNTER — NON-APPOINTMENT (OUTPATIENT)
Age: 73
End: 2023-04-14

## 2023-04-18 ENCOUNTER — APPOINTMENT (OUTPATIENT)
Dept: ORTHOPEDIC SURGERY | Facility: CLINIC | Age: 73
End: 2023-04-18
Payer: MEDICARE

## 2023-04-18 DIAGNOSIS — R22.31 LOCALIZED SWELLING, MASS AND LUMP, RIGHT UPPER LIMB: ICD-10-CM

## 2023-04-18 DIAGNOSIS — M65.322 TRIGGER FINGER, LEFT INDEX FINGER: ICD-10-CM

## 2023-04-18 PROCEDURE — 20612 ASPIRATE/INJ GANGLION CYST: CPT | Mod: F9

## 2023-04-18 PROCEDURE — 20550 NJX 1 TENDON SHEATH/LIGAMENT: CPT | Mod: F1

## 2023-04-18 PROCEDURE — 99203 OFFICE O/P NEW LOW 30 MIN: CPT | Mod: 25

## 2023-04-18 PROCEDURE — 99204 OFFICE O/P NEW MOD 45 MIN: CPT | Mod: 25

## 2023-05-16 ENCOUNTER — APPOINTMENT (OUTPATIENT)
Dept: ORTHOPEDIC SURGERY | Facility: CLINIC | Age: 73
End: 2023-05-16
Payer: MEDICARE

## 2023-05-16 PROCEDURE — 26055 INCISE FINGER TENDON SHEATH: CPT | Mod: F7

## 2023-05-30 ENCOUNTER — APPOINTMENT (OUTPATIENT)
Dept: ORTHOPEDIC SURGERY | Facility: CLINIC | Age: 73
End: 2023-05-30
Payer: MEDICARE

## 2023-05-30 DIAGNOSIS — M65.332 TRIGGER FINGER, LEFT MIDDLE FINGER: ICD-10-CM

## 2023-05-30 PROCEDURE — 99024 POSTOP FOLLOW-UP VISIT: CPT

## 2023-09-27 NOTE — ED ADULT TRIAGE NOTE - ACCOMPANIED BY
2023       Chyna Su MD  [id number]  [facility] [id number]  [facility] [address]  Via Fax: [phone]      Patient: Sukhdeep Chester   YOB: 1957   Date of Visit: 2023       Dear Dr. Su:    Thank you for referring Sukhdeep Chester to me for evaluation. Below are my notes for this visit with him.    If you have questions, please do not hesitate to call me. I look forward to following your patient along with you.      Sincerely,        Cookie Barillas CNP        CC: No Recipients  Cookie Barillas CNP  2023 11:32 AM  Signed    PATIENT:  Sukhdeep Chester   : 1957  Referring physician: Chyna Su MD      CHIEF COMPLAINT:   Chief Complaint   Patient presents with   • Cardiac Clearance     Pre-operative clearance       HISTORY OF PRESENT ILLNESS:  Sukhdeep is a 66 year old male with history of tension, obesity and symptoms paroxysmal atrial flutter patient  and afib s/p cryo +PVI 2021 typically only follows with Dr. Win in the department of electrophysiology in our office.   He is here today for preop risk stratification prior to EGD on 10/23/2023 with YESSI Ordonez at [facility] under MAC anesthesia.   He just completed a CT CAC as advised by pcp. Hasn't heard results yet from his pcp    The patient has been doing well. No exertional symptoms such as chest pain or shortness of breath. He intentionally lost weight with Keto diet. He is physically active by using a resistance weight training. He does some walking but hip pain seems to limit how long he can walk. Now walking 1-2 miles a couple times a week.   Denies palpitations, orthopnea/pnd, presyncope/syncope, claudications, blood or black stools.   No falls.   Medications reviewed and is taking as prescribed.       PAST MEDICAL HISTORY:    Past Medical History:   Diagnosis Date   • Allergy    • BPH (benign prostatic hyperplasia)    • Class 2 severe obesity due to excess calories with serious  comorbidity and body mass index (BMI) of 36.0 to 36.9 in adult (CMD)    • Essential (primary) hypertension    • GERD (gastroesophageal reflux disease)    • Paroxysmal atrial fibrillation (CMD)     s/p cryo + PVI 11/21 (right sided veins unable to isolate) remains on xarelto   • Sleep apnea     USES CPAP   • Typical atrial flutter (CMD)    • Vertigo         ALLERGIES:    ALLERGIES:  No Known Allergies    MEDICATIONS:     Current Outpatient Medications   Medication Sig Dispense Refill   • Xarelto 20 MG Tab TAKE 1 TABLET BY MOUTH DAILY WITH DINNER 30 tablet 11   • meclizine (ANTIVERT) 25 MG Chew Tab Chew 25 mg by mouth as needed. Pt hasn't taken it in a long time     • famotidine (PEPCID) 40 MG tablet Take 40 mg by mouth daily.     • sildenafil (VIAGRA) 100 MG tablet Take 100 mg by mouth as needed for Erectile Dysfunction.     • dicyclomine (BENTYL) 20 MG tablet Take 20 mg by mouth as needed.     • psyllium (METAMUCIL MULTIHEALTH FIBER) 58.12 % packet for oral suspension Take by mouth daily.     • Peppermint Oil (IBgard) 90 MG Cap CR Take 1 capsule by mouth as needed.     • Ascorbic Acid (vitamin C) 1000 MG tablet Take 1,000 mg by mouth daily.     • VITAMIN D, CHOLECALCIFEROL, PO Take 150 mcg by mouth daily.     • loratadine (CLARITIN) 10 MG tablet Take 10 mg by mouth daily.     • Zinc 50 MG Tab Take 50 mg by mouth daily.     • amLODIPine (NORVASC) 5 MG tablet Take 5 mg by mouth every morning.      • pantoprazole (PROTONIX) 40 MG tablet Take 40 mg by mouth daily (before breakfast).      • Icosapent Ethyl 1 g Cap Take 2 g by mouth 2 times daily. Vascepa     • fluticasone (FLONASE) 50 MCG/ACT nasal spray Spray in each nostril daily.     • hydrochlorothiazide (HYDRODIURIL) 25 MG tablet Take 25 mg by mouth daily. Taking 12.5 mg daily       No current facility-administered medications for this visit.       SOCIAL HISTORY:    Social History     Tobacco Use   • Smoking status: Former     Current packs/day: 0.00     Average  packs/day: 0.5 packs/day for 4.0 years (2.0 ttl pk-yrs)     Types: Cigars, Cigarettes     Start date:      Quit date:      Years since quittin.7   • Smokeless tobacco: Never   Vaping Use   • Vaping Use: never used   Substance Use Topics   • Alcohol use: Yes     Comment: 1 drink a day    • Drug use: Never       FAMILY HISTORY:    Family History   Problem Relation Age of Onset   • Cancer, Pancreatic Mother    • Congestive Heart Failure Mother    • Cancer, Lung Father    • Hypertension Father        REVIEW OF SYSTEMS:    Respiratory: Cough: denies.   Constitutional: Fatigue: denies.   Dermatologic: Rash: denies.   Endocrine: Sleep disturbance: denies.   Gastrointestinal: Abdomina pain: denies.   Hematologic: Abnormal bleeding: denies.   Musculoskeletal: Muscle aches: denies.   Neurologic: Dizziness: denies.   Ophthalmologic: Loss of vision: denies.   Psychology: Depression: denies.   Urologic: Blood in urine: denies.   Cardiac: As per HPI    Remainder are reviewed and are negative.       PHYSICAL EXAMINATION:  /74 (BP Location: LUE - Left upper extremity, Patient Position: Sitting, Cuff Size: Regular)   Pulse 72   Ht 6' 5\" (1.956 m)   Wt 117.8 kg (259 lb 12.8 oz)   BMI 30.81 kg/m²   BSA 2.5 m²     Constitutional: appears well-developed and well-nourished. Alert and oriented.   Head: Normocephalic and atraumatic.   Nose: Nose normal.   Mouth/Throat: Mucous membranes are moist.  Normal oropharynx  Eyes: Pupils are equal, round, and reactive to light.   Neck: Normal range of motion. Neck supple. No thyromegaly  Cardiovascular: Regular rate and rhythm, normal S1, S2 no S3 or S4. No murmur, rub or gallops  Pulmonary: Effort normal and breath sounds normal. No wheezes, rales or rhonchi  Abdominal: Soft. Bowel sounds are normal. No hepatosplenomegaly.  Musculoskeletal: Normal range of motion. No tenderness.   Neurological: grossly normal.   Skin: Skin is warm and dry.   Psych: normal mood and  affect  Extremities: no significant edema      I personally reviewed and interpreted recent laboratory values in the chart.   LABS  Hemoglobin A1C (%)   Date Value   07/13/2021 5.2       CHOLESTEROL (mg/dL)   Date Value   02/17/2023 201 (H)     HDL (mg/dL)   Date Value   02/17/2023 41     TRIGLYCERIDE (mg/dL)   Date Value   02/17/2023 178 (H)     CALCULATED LDL (mg/dL)   Date Value   02/17/2023 124       CARDIAC TESTING:   I have reviewed the pertinent imaging study reports. These are the pertinent findings:  · ECG performed and personally reviewed today - NSR 09/27/2023  · Lipids -   · TTE -   · Stress Test -   · Cardiac MRI -   · Coronary angiography -     ASSESSMENT AND PLAN:    66 year old with     Preop cardiovascular exam  The patient is intermediate risk for low risk surgery.   Able to perform>4 METS. EKG in the office today personally reviewed shows SR without concerning findings.   No further cardiac testing is recommended prior to surgery planned.   The patient should hold _rivaroxaban X 48 hours prior to surgery and then resume post procedurally when advised to do so by the physician who is requesting risk stratification.  I discussed with the patient the risk of holding rivaroxaban.    Class 2 severe obesity due to excess calories with serious comorbidity and body mass index (BMI) of 36.0 to 36.9 in adult (CMD)  I discussed weight loss strategies with the patient including carbohydrate restricted diet, portion control, and suggested Weight Watchers as a viable option.    Typical atrial flutter (CMD)  Following with GLENNY Pan. Doing well. Is in normal rhythm    Paroxysmal atrial fibrillation (CMD)  Is in normal rhythm on exam, confirmed by ekg performed today.   Doing well.   On a/c in  The form of rivaroxaban    Essential (primary) hypertension  controlled    KIRAN  Compliant with cpap mask, sleeping well.    Others   he just completed CT CAC and asked that I look at result (ordered by his PCP). CAC is  Immediate family member elevated. I advise starting statin therapy either atorvastatin 40mg/day or rosuvastatin 20mg/day but will defer to his pcp , no aspirin needed as he is already on a/c with rivaroxaban.   He is physically active without concerning cardiac symptoms so no stress test advised at this time.         Medications Discontinued During This Encounter   Medication Reason   • Xarelto 20 MG Tab Error       Orders Placed This Encounter   • Electrocardiogram 12-Lead         No follow-ups on file.follow up as scheduled with EP           Cookie Barillas, FNP-BC, CVNP-[facility] [place]  [facility]    The supervising physician for services performed today is Dr Fernandez  Plan of care discussed with the patient.  All questions were answered.  Patient verbalized understanding and agrees with the plan  The 21st Century Cures Act makes medical notes like these available to patients in the interest of transparency. However, be advised this is a medical document. It is intended as peer to peer communication. It is written in medical language and may contain abbreviations, jargon, and other verbiage that could be misleading or confusing to lay persons. It may appear blunt or direct. Medical documents are intended to carry relevant information, facts as evident, and the clinical opinion of the physician and/or advanced practice clinician.          A letter has been sent to the referring physician

## 2023-12-08 ENCOUNTER — NON-APPOINTMENT (OUTPATIENT)
Age: 73
End: 2023-12-08

## 2024-07-18 ENCOUNTER — NON-APPOINTMENT (OUTPATIENT)
Age: 74
End: 2024-07-18

## 2024-07-30 ENCOUNTER — APPOINTMENT (OUTPATIENT)
Dept: ORTHOPEDIC SURGERY | Facility: CLINIC | Age: 74
End: 2024-07-30

## 2024-07-30 VITALS — RESPIRATION RATE: 16 BRPM | HEIGHT: 72 IN | BODY MASS INDEX: 29.53 KG/M2 | WEIGHT: 218 LBS

## 2024-07-30 DIAGNOSIS — M65.342 TRIGGER FINGER, LEFT RING FINGER: ICD-10-CM

## 2024-07-30 DIAGNOSIS — M65.322 TRIGGER FINGER, LEFT INDEX FINGER: ICD-10-CM

## 2024-07-30 PROCEDURE — 99214 OFFICE O/P EST MOD 30 MIN: CPT | Mod: 25

## 2024-07-30 PROCEDURE — 73120 X-RAY EXAM OF HAND: CPT | Mod: 50

## 2024-07-30 PROCEDURE — 20550 NJX 1 TENDON SHEATH/LIGAMENT: CPT | Mod: LT

## 2024-07-30 NOTE — PHYSICAL EXAM
Medical Week 3 Survey      Responses   [facility] patient discharged from?  [facility]   Does the patient have one of the following disease processes/diagnoses(primary or secondary)?  Other   Week 3 attempt successful?  No   Unsuccessful attempts  Attempt 1          Jacquelyn Varela LPN   [de-identified] : Physical exam shows the patient to be alert and oriented x3, capable of ambulation. The patient is well-developed and well-nourished in no apparent respiratory distress. Majority of the skin is intact bilaterally in the upper extremities without lymphadenopathy at the elbows.  Well-healed scar at the base of the left third digit.  There is positive swelling and tenderness localized to the A1 pulley of the left second and fourth digit with locking upon compression of the pulley.. There is no evidence of infection. No tenderness of the MP, PIP or DIP joint and no evidence of instability bilaterally. The FDS FDP and extensor mechanism is intact without instability and with 5 over 5 strength bilaterally in the digits.  There is good capillary refill of the digits bilaterally.There is no masses or sensitivity over the median and ulnar nerves at the level of the wrist. There is a negative Tinel's and negative Phalen's sign bilaterally. The sensation is grossly intact bilaterally. [de-identified] : PA lateral of both hands shows mild degenerative changes over the DIP joints greater than PIP joints and MP joints and the radiocarpal midcarpal joints are well-preserved without evidence of soft tissue calcifications.

## 2024-07-30 NOTE — ASSESSMENT
[FreeTextEntry1] : Although the patient has diffuse arthritis of the hands this is clinically asymptomatic.  Majority of his symptoms appear to be coming from a left second and fourth trigger finger.  My impression is that this patient has stenosing tenosynovitis of the left second and fourth tendon sheath finger, more commonly known as a trigger finger.    The risks benefits and alternatives of treatment were discussed ranging from conservative to aggressive forms of treatment.  This included (but was not limited to) pain, infection, subcutaneous atrophy, skin depigmentation, tendon rupture, recurrence, incomplete relief of symptoms, tissue loss etc...  They agreed to undergo the steroid injection.   Under informed consent and sterile conditions, 1/2 cc of 2% plain lidocaine  and 1/2 cc of Kenalog 10 was precisely injected into the patient's finger.   A sterile Band-Aid was placed and a home program was elected over occupational therapy.    It is my hope that this significantly alleviates the patient's symptoms. If symptoms are not fully resolved in the next 4-6 weeks they were encouraged to return to the office for reevaluation. Patient may also consider other forms of conservative care or surgical decompression which was discussed in the office today.   If symptoms are resolved they can followup on a as-needed basis.

## 2024-07-30 NOTE — HISTORY OF PRESENT ILLNESS
[Right] : right hand dominant [FreeTextEntry1] : Patient here for initial evaluation of left index and left fourth trigger finger with no previous injections or trauma.  Patient had his left third trigger finger released in the past which is doing well.

## 2024-09-11 ENCOUNTER — APPOINTMENT (OUTPATIENT)
Dept: COLORECTAL SURGERY | Facility: CLINIC | Age: 74
End: 2024-09-11

## 2024-09-16 ENCOUNTER — APPOINTMENT (OUTPATIENT)
Dept: COLORECTAL SURGERY | Facility: CLINIC | Age: 74
End: 2024-09-16
Payer: MEDICARE

## 2024-09-16 VITALS
WEIGHT: 216 LBS | HEIGHT: 72 IN | OXYGEN SATURATION: 97 % | DIASTOLIC BLOOD PRESSURE: 82 MMHG | TEMPERATURE: 97.8 F | SYSTOLIC BLOOD PRESSURE: 154 MMHG | HEART RATE: 61 BPM | BODY MASS INDEX: 29.26 KG/M2 | RESPIRATION RATE: 15 BRPM

## 2024-09-16 DIAGNOSIS — K64.8 OTHER HEMORRHOIDS: ICD-10-CM

## 2024-09-16 PROCEDURE — 46600 DIAGNOSTIC ANOSCOPY SPX: CPT

## 2024-09-16 PROCEDURE — 99204 OFFICE O/P NEW MOD 45 MIN: CPT | Mod: 25

## 2024-09-16 RX ORDER — HYDROCORTISONE 25 MG/G
2.5 CREAM TOPICAL
Qty: 1 | Refills: 3 | Status: ACTIVE | COMMUNITY
Start: 2024-09-16 | End: 1900-01-01

## 2024-09-16 NOTE — REVIEW OF SYSTEMS
[As Noted in HPI] : as noted in HPI [Negative] : Heme/Lymph [FreeTextEntry8] : hx of prostate cancer [de-identified] : B/L foot neuropathy

## 2024-09-16 NOTE — ASSESSMENT
[FreeTextEntry1] : 74-year-old male up-to-date on his colonoscopy with recent finding of suspicious anal lesion on MRI.  Physical exam reveals resolving thrombosed left lateral internal hemorrhoid.  The rest of his hemorrhoids are somewhat bulky but as the patient is asymptomatic I recommend no treatment other than Proctosol cream fiber supplementation sitz bath's and follow-up in 1 month. Plan: Follow-up in 1 month rigid sigmoidoscopy at next visit

## 2024-09-16 NOTE — HISTORY OF PRESENT ILLNESS
[FreeTextEntry1] : 73 y/o male with an anal lesion presents today for initial consultation.  He has history of prostate cancer and had an MRI done which revealed the anal lesion.  He was referred by his gastroenterologist Dr. Singh for evaluation of this lesion.  He denies any abdominal pain, nausea, vomiting, constipation, diarrhea or bleeding.  Last colonoscopy was done in February 2024 by his gastroenterologist.  He reports that several small polyps were removed which were benign and 3 year follow up is recommended.

## 2024-09-16 NOTE — PHYSICAL EXAM
[Normal Breath Sounds] : Normal breath sounds [Normal Heart Sounds] : normal heart sounds [Normal Rate and Rhythm] : normal rate and rhythm [No Rash or Lesion] : No rash or lesion [Alert] : alert [Oriented to Person] : oriented to person [Oriented to Place] : oriented to place [Oriented to Time] : oriented to time [Calm] : calm [Abdomen Masses] : No abdominal masses [Abdomen Tenderness] : ~T No ~M abdominal tenderness [Tender] : nontender [Normal rectal exam] : exam was normal [None] : no anal fissures seen [Excoriation] : no perianal excoriation [Multiple Sinus Tracts] : no perianal sinus tracts [Fistula] : no fistulas [Wart] : no warts [Ulcer ___ cm] : no ulcers [Pilonidal Cyst] : no pilonidal cysts [Pilonidal Sinus] : no pilonidal sinus [Pilonidal Sinus Draining] : no pilonidal sinus drainage [Nonprolapsing] : a nonprolapsing (grade I) [Tender, Swollen] : nontender, non-swollen [Thrombosed] : that was not thrombosed [Skin Tags] : there were no residual hemorrhoidal skin tags seen [Normal] : was normal [JVD] : no jugular venous distention  [Wheezing] : no wheezing was heard [Purpura] : no purpura  [Petechiae] : no petechiae [Skin Ulcer] : no ulcer [Skin Induration] : no induration [de-identified] : benign [de-identified] : bulky internal hemorrhoids, resolving thrombosed internal hemorrhoid [de-identified] : healthy, NAD [de-identified] : NC/AT [de-identified] : LIZETTE, steady gait

## 2024-10-14 ENCOUNTER — APPOINTMENT (OUTPATIENT)
Dept: COLORECTAL SURGERY | Facility: CLINIC | Age: 74
End: 2024-10-14
Payer: MEDICARE

## 2024-10-14 DIAGNOSIS — K64.8 OTHER HEMORRHOIDS: ICD-10-CM

## 2024-10-14 PROCEDURE — 99212 OFFICE O/P EST SF 10 MIN: CPT | Mod: 25

## 2024-10-14 PROCEDURE — 46600 DIAGNOSTIC ANOSCOPY SPX: CPT

## 2024-12-22 ENCOUNTER — NON-APPOINTMENT (OUTPATIENT)
Age: 74
End: 2024-12-22

## 2025-09-17 ENCOUNTER — APPOINTMENT (OUTPATIENT)
Dept: ORTHOPEDIC SURGERY | Facility: CLINIC | Age: 75
End: 2025-09-17
Payer: MEDICARE

## 2025-09-17 VITALS — WEIGHT: 221 LBS | HEIGHT: 73 IN | BODY MASS INDEX: 29.29 KG/M2

## 2025-09-17 DIAGNOSIS — M54.16 RADICULOPATHY, LUMBAR REGION: ICD-10-CM

## 2025-09-17 PROCEDURE — 72110 X-RAY EXAM L-2 SPINE 4/>VWS: CPT

## 2025-09-17 PROCEDURE — 99204 OFFICE O/P NEW MOD 45 MIN: CPT

## 2025-09-17 RX ORDER — CYCLOBENZAPRINE HYDROCHLORIDE 5 MG/1
5 TABLET, FILM COATED ORAL 3 TIMES DAILY
Qty: 60 | Refills: 0 | Status: ACTIVE | COMMUNITY
Start: 2025-09-17 | End: 1900-01-01

## 2025-09-17 RX ORDER — MELOXICAM 15 MG/1
15 TABLET ORAL DAILY
Qty: 28 | Refills: 1 | Status: ACTIVE | COMMUNITY
Start: 2025-09-17 | End: 1900-01-01